# Patient Record
Sex: MALE | Race: WHITE | NOT HISPANIC OR LATINO | Employment: FULL TIME | URBAN - METROPOLITAN AREA
[De-identification: names, ages, dates, MRNs, and addresses within clinical notes are randomized per-mention and may not be internally consistent; named-entity substitution may affect disease eponyms.]

---

## 2021-04-16 ENCOUNTER — BULK ORDERING (OUTPATIENT)
Dept: CASE MANAGEMENT | Facility: OTHER | Age: 21
End: 2021-04-16

## 2021-04-16 DIAGNOSIS — Z23 IMMUNIZATION DUE: ICD-10-CM

## 2021-07-13 ENCOUNTER — OFFICE VISIT (OUTPATIENT)
Dept: FAMILY MEDICINE CLINIC | Facility: CLINIC | Age: 21
End: 2021-07-13

## 2021-07-13 VITALS
OXYGEN SATURATION: 98 % | BODY MASS INDEX: 20.04 KG/M2 | HEART RATE: 100 BPM | RESPIRATION RATE: 16 BRPM | DIASTOLIC BLOOD PRESSURE: 78 MMHG | TEMPERATURE: 98.8 F | SYSTOLIC BLOOD PRESSURE: 112 MMHG | HEIGHT: 70 IN | WEIGHT: 140 LBS

## 2021-07-13 DIAGNOSIS — R10.13 EPIGASTRIC DISCOMFORT: Primary | ICD-10-CM

## 2021-07-13 DIAGNOSIS — Z00.00 LABORATORY EXAMINATION ORDERED AS PART OF A ROUTINE GENERAL MEDICAL EXAMINATION: ICD-10-CM

## 2021-07-13 DIAGNOSIS — E55.9 VITAMIN D DEFICIENCY: ICD-10-CM

## 2021-07-13 PROCEDURE — 99203 OFFICE O/P NEW LOW 30 MIN: CPT | Performed by: PHYSICIAN ASSISTANT

## 2021-07-13 NOTE — PROGRESS NOTES
Subjective   Artemio Muñoz is a 20 y.o. male.     History of Present Illness   Artemio Muñoz 20 y.o. male who presents today for a new patient appointment.    he has a history of There is no problem list on file for this patient.  .  he is here to establish care I reviewed the PFSH recorded today by my MA/LPN staff.   he   He has been feeling well.  Gets episodes occas GERD---and can get episodes epigastric pain---random. Once every 2 mo.  Has not taken anything. Wants to try PRN OTC PPI and see me back if no help or worse    He is quadruplet   Works full time in [employer].  Lives with family  Walks and does exercise  Goes to gym  The following portions of the patient's history were reviewed and updated as appropriate: allergies, current medications, past family history, past medical history, past social history, past surgical history and problem list.    Review of Systems   Constitutional: Negative for fever.   HENT: Negative for nosebleeds and trouble swallowing.    Eyes: Negative for visual disturbance.   Respiratory: Negative for choking and stridor.    Cardiovascular: Positive for chest pain.   Gastrointestinal: Negative for blood in stool.   Endocrine: Negative for polydipsia.   Genitourinary: Negative for genital sores and hematuria.   Musculoskeletal: Negative for arthralgias and joint swelling.   Skin: Negative for color change and rash.   Allergic/Immunologic: Negative for immunocompromised state.   Neurological: Negative for seizures, facial asymmetry and speech difficulty.   Hematological: Negative for adenopathy.   Psychiatric/Behavioral: Negative for behavioral problems, self-injury and suicidal ideas.       Objective   Physical Exam  Vitals and nursing note reviewed.   Constitutional:       General: He is not in acute distress.     Appearance: Normal appearance. He is well-developed. He is not diaphoretic.   HENT:      Head: Normocephalic.      Right Ear: Tympanic membrane, ear canal and  external ear normal.      Left Ear: Tympanic membrane and external ear normal.      Nose: Nose normal.   Eyes:      General: No scleral icterus.     Conjunctiva/sclera: Conjunctivae normal.      Pupils: Pupils are equal, round, and reactive to light.   Neck:      Vascular: No carotid bruit.   Cardiovascular:      Rate and Rhythm: Normal rate and regular rhythm.      Pulses: Normal pulses.      Heart sounds: Normal heart sounds. No murmur heard.     Pulmonary:      Effort: Pulmonary effort is normal.      Breath sounds: Normal breath sounds. No rales.   Abdominal:      General: Abdomen is flat. Bowel sounds are normal.      Palpations: Abdomen is soft. There is no mass.      Tenderness: There is no abdominal tenderness.      Hernia: No hernia is present.   Musculoskeletal:         General: Normal range of motion.      Cervical back: Normal range of motion and neck supple.      Right lower leg: No edema.      Left lower leg: Edema present.   Lymphadenopathy:      Cervical: No cervical adenopathy.   Skin:     General: Skin is warm and dry.      Coloration: Skin is not jaundiced.      Findings: No lesion or rash.   Neurological:      General: No focal deficit present.      Mental Status: He is alert and oriented to person, place, and time. Mental status is at baseline.      Cranial Nerves: No cranial nerve deficit.      Sensory: No sensory deficit.      Motor: No weakness.      Coordination: Coordination normal.   Psychiatric:         Mood and Affect: Mood normal. Affect is not inappropriate.         Behavior: Behavior normal.         Thought Content: Thought content normal.         Judgment: Judgment normal.         Assessment/Plan   Diagnoses and all orders for this visit:    1. Epigastric discomfort (Primary)  Comments:  now resolved and intermittent;  also occas GERD    2. Vitamin D deficiency  -     Comprehensive metabolic panel  -     Lipid panel  -     CBC and Differential  -     TSH  -     T3, Free  -     T4,  Free  -     Vitamin B12  -     Folate  -     Vitamin D 25 Hydroxy  -     Urinalysis With Microscopic - Urine, Clean Catch    3. Laboratory examination ordered as part of a routine general medical examination  -     Comprehensive metabolic panel  -     Lipid panel  -     CBC and Differential  -     TSH  -     T3, Free  -     T4, Free  -     Vitamin B12  -     Folate  -     Vitamin D 25 Hydroxy  -     Urinalysis With Microscopic - Urine, Clean Catch        Ok trial Omeprazole PRN if GERD and or epigastric pain --see me if worse or no help  Scan me old immunization records

## 2021-07-14 LAB
25(OH)D3+25(OH)D2 SERPL-MCNC: 37.7 NG/ML (ref 30–100)
ALBUMIN SERPL-MCNC: 5.2 G/DL (ref 3.5–5.2)
ALBUMIN/GLOB SERPL: 2.2 G/DL
ALP SERPL-CCNC: 81 U/L (ref 39–117)
ALT SERPL-CCNC: 20 U/L (ref 1–41)
APPEARANCE UR: ABNORMAL
AST SERPL-CCNC: 17 U/L (ref 1–40)
BACTERIA #/AREA URNS HPF: NORMAL /HPF
BASOPHILS # BLD AUTO: 0.03 10*3/MM3 (ref 0–0.2)
BASOPHILS NFR BLD AUTO: 0.5 % (ref 0–1.5)
BILIRUB SERPL-MCNC: 0.6 MG/DL (ref 0–1.2)
BILIRUB UR QL STRIP: NEGATIVE
BUN SERPL-MCNC: 11 MG/DL (ref 6–20)
BUN/CREAT SERPL: 13.8 (ref 7–25)
CALCIUM SERPL-MCNC: 10.3 MG/DL (ref 8.6–10.5)
CASTS URNS MICRO: NORMAL
CHLORIDE SERPL-SCNC: 100 MMOL/L (ref 98–107)
CHOLEST SERPL-MCNC: 188 MG/DL (ref 0–200)
CO2 SERPL-SCNC: 30 MMOL/L (ref 22–29)
COLOR UR: YELLOW
CREAT SERPL-MCNC: 0.8 MG/DL (ref 0.76–1.27)
EOSINOPHIL # BLD AUTO: 0.13 10*3/MM3 (ref 0–0.4)
EOSINOPHIL NFR BLD AUTO: 2 % (ref 0.3–6.2)
EPI CELLS #/AREA URNS HPF: NORMAL /HPF
ERYTHROCYTE [DISTWIDTH] IN BLOOD BY AUTOMATED COUNT: 12.1 % (ref 12.3–15.4)
FOLATE SERPL-MCNC: 13.7 NG/ML (ref 4.78–24.2)
GLOBULIN SER CALC-MCNC: 2.4 GM/DL
GLUCOSE SERPL-MCNC: 80 MG/DL (ref 65–99)
GLUCOSE UR QL: NEGATIVE
HCT VFR BLD AUTO: 48.2 % (ref 37.5–51)
HDLC SERPL-MCNC: 58 MG/DL (ref 40–60)
HGB BLD-MCNC: 16.4 G/DL (ref 13–17.7)
HGB UR QL STRIP: NEGATIVE
IMM GRANULOCYTES # BLD AUTO: 0.03 10*3/MM3 (ref 0–0.05)
IMM GRANULOCYTES NFR BLD AUTO: 0.5 % (ref 0–0.5)
KETONES UR QL STRIP: NEGATIVE
LDLC SERPL CALC-MCNC: 106 MG/DL (ref 0–100)
LEUKOCYTE ESTERASE UR QL STRIP: NEGATIVE
LYMPHOCYTES # BLD AUTO: 2.3 10*3/MM3 (ref 0.7–3.1)
LYMPHOCYTES NFR BLD AUTO: 34.8 % (ref 19.6–45.3)
MCH RBC QN AUTO: 30.7 PG (ref 26.6–33)
MCHC RBC AUTO-ENTMCNC: 34 G/DL (ref 31.5–35.7)
MCV RBC AUTO: 90.3 FL (ref 79–97)
MONOCYTES # BLD AUTO: 0.76 10*3/MM3 (ref 0.1–0.9)
MONOCYTES NFR BLD AUTO: 11.5 % (ref 5–12)
NEUTROPHILS # BLD AUTO: 3.35 10*3/MM3 (ref 1.7–7)
NEUTROPHILS NFR BLD AUTO: 50.7 % (ref 42.7–76)
NITRITE UR QL STRIP: NEGATIVE
NRBC BLD AUTO-RTO: 0 /100 WBC (ref 0–0.2)
PH UR STRIP: 7.5 [PH] (ref 5–8)
PLATELET # BLD AUTO: 256 10*3/MM3 (ref 140–450)
POTASSIUM SERPL-SCNC: 4.6 MMOL/L (ref 3.5–5.2)
PROT SERPL-MCNC: 7.6 G/DL (ref 6–8.5)
PROT UR QL STRIP: NEGATIVE
RBC # BLD AUTO: 5.34 10*6/MM3 (ref 4.14–5.8)
RBC #/AREA URNS HPF: NORMAL /HPF
SODIUM SERPL-SCNC: 140 MMOL/L (ref 136–145)
SP GR UR: 1.02 (ref 1–1.03)
T3FREE SERPL-MCNC: 4.3 PG/ML (ref 2–4.4)
T4 FREE SERPL-MCNC: 1.15 NG/DL (ref 0.93–1.7)
TRIGL SERPL-MCNC: 139 MG/DL (ref 0–150)
TSH SERPL DL<=0.005 MIU/L-ACNC: 1.28 UIU/ML (ref 0.27–4.2)
UROBILINOGEN UR STRIP-MCNC: ABNORMAL MG/DL
VIT B12 SERPL-MCNC: 449 PG/ML (ref 211–946)
VLDLC SERPL CALC-MCNC: 24 MG/DL (ref 5–40)
WBC # BLD AUTO: 6.6 10*3/MM3 (ref 3.4–10.8)
WBC #/AREA URNS HPF: NORMAL /HPF

## 2021-09-22 ENCOUNTER — OFFICE VISIT (OUTPATIENT)
Dept: FAMILY MEDICINE CLINIC | Facility: CLINIC | Age: 21
End: 2021-09-22

## 2021-09-22 VITALS
BODY MASS INDEX: 19.18 KG/M2 | HEART RATE: 70 BPM | RESPIRATION RATE: 16 BRPM | DIASTOLIC BLOOD PRESSURE: 69 MMHG | OXYGEN SATURATION: 97 % | WEIGHT: 134 LBS | HEIGHT: 70 IN | TEMPERATURE: 97.8 F | SYSTOLIC BLOOD PRESSURE: 122 MMHG

## 2021-09-22 DIAGNOSIS — R63.4 WEIGHT LOSS, UNINTENTIONAL: ICD-10-CM

## 2021-09-22 DIAGNOSIS — R42 ORTHOSTATIC DIZZINESS: Primary | ICD-10-CM

## 2021-09-22 PROCEDURE — 99213 OFFICE O/P EST LOW 20 MIN: CPT

## 2021-09-22 NOTE — PROGRESS NOTES
"Chief Complaint  Headache, Dizziness, and Blurred Vision    Subjective          Artemio Muñoz presents to [facility]  History of Present Illness    Artemio Muñoz 21 y.o. male who presents with intermittent mild headaches, dizziness, and blurred vision for two days. He had oral surgery on Friday with no complications. He was prescribed Hydrocodone from the oral surgeon. He started taking the medication on Friday, last dose was Saturday afternoon, and was not eating when he started the medication. He returned to a regular diet on Saturday with no difficulty. He reports the dizziness occurs when he changes positions too quickly, and then subsides soon after. No history of hypo/hypertension or hypo/hyperglycemia. No neurological history.  No vision loss, eye pain, photophobia, confusion, seizures, weakness or syncope.  He does not take any daily medications.     The patient lost six pounds in less than two months, although he has been trying to gain weight. He is concerned about his weight loss.        He has a problem list of the following: There is no problem list on file for this patient.  .        Since the last visit, He has overall felt well.        He has been compliant with the following medications: No current outpatient medications on file..        He  denies medication side effects.      All of the other chronic condition(s) listed above are stable w/o issues.    /69   Pulse 70   Temp 97.8 °F (36.6 °C)   Resp 16   Ht 177.8 cm (70\")   Wt 60.8 kg (134 lb)   SpO2 97%   BMI 19.23 kg/m²     Results for orders placed or performed in visit on 07/13/21   Comprehensive metabolic panel    Specimen: Blood   Result Value Ref Range    Glucose 80 65 - 99 mg/dL    BUN 11 6 - 20 mg/dL    Creatinine 0.80 0.76 - 1.27 mg/dL    eGFR Non African Am 122 >60 mL/min/1.73    eGFR African Am 148 >60 mL/min/1.73    BUN/Creatinine Ratio 13.8 7.0 - 25.0    Sodium 140 136 - 145 mmol/L    " Potassium 4.6 3.5 - 5.2 mmol/L    Chloride 100 98 - 107 mmol/L    Total CO2 30.0 (H) 22.0 - 29.0 mmol/L    Calcium 10.3 8.6 - 10.5 mg/dL    Total Protein 7.6 6.0 - 8.5 g/dL    Albumin 5.20 3.50 - 5.20 g/dL    Globulin 2.4 gm/dL    A/G Ratio 2.2 g/dL    Total Bilirubin 0.6 0.0 - 1.2 mg/dL    Alkaline Phosphatase 81 39 - 117 U/L    AST (SGOT) 17 1 - 40 U/L    ALT (SGPT) 20 1 - 41 U/L   Lipid panel    Specimen: Blood   Result Value Ref Range    Total Cholesterol 188 0 - 200 mg/dL    Triglycerides 139 0 - 150 mg/dL    HDL Cholesterol 58 40 - 60 mg/dL    VLDL Cholesterol [place] 24 5 - 40 mg/dL    LDL Chol Calc (NIH) 106 (H) 0 - 100 mg/dL   TSH    Specimen: Blood   Result Value Ref Range    TSH 1.280 0.270 - 4.200 uIU/mL   T3, Free    Specimen: Blood   Result Value Ref Range    T3, Free 4.3 2.0 - 4.4 pg/mL   T4, Free    Specimen: Blood   Result Value Ref Range    Free T4 1.15 0.93 - 1.70 ng/dL   Vitamin B12    Specimen: Blood   Result Value Ref Range    Vitamin B-12 449 211 - 946 pg/mL   Folate    Specimen: Blood   Result Value Ref Range    Folate 13.70 4.78 - 24.20 ng/mL   Vitamin D 25 Hydroxy    Specimen: Blood   Result Value Ref Range    25 Hydroxy, Vitamin D 37.7 30.0 - 100.0 ng/mL   Microscopic Examination -   Result Value Ref Range    WBC, UA 0-2 /HPF    RBC, UA Comment /HPF    Epithelial Cells (non renal) 0-2 /HPF    Cast Type Comment     Bacteria, UA Comment None Seen /HPF   CBC and Differential    Specimen: Blood   Result Value Ref Range    WBC 6.60 3.40 - 10.80 10*3/mm3    RBC 5.34 4.14 - 5.80 10*6/mm3    Hemoglobin 16.4 13.0 - 17.7 g/dL    Hematocrit 48.2 37.5 - 51.0 %    MCV 90.3 79.0 - 97.0 fL    MCH 30.7 26.6 - 33.0 pg    MCHC 34.0 31.5 - 35.7 g/dL    RDW 12.1 (L) 12.3 - 15.4 %    Platelets 256 140 - 450 10*3/mm3    Neutrophil Rel % 50.7 42.7 - 76.0 %    Lymphocyte Rel % 34.8 19.6 - 45.3 %    Monocyte Rel % 11.5 5.0 - 12.0 %    Eosinophil Rel % 2.0 0.3 - 6.2 %    Basophil Rel % 0.5 0.0 - 1.5 %    Neutrophils  "Absolute 3.35 1.70 - 7.00 10*3/mm3    Lymphocytes Absolute 2.30 0.70 - 3.10 10*3/mm3    Monocytes Absolute 0.76 0.10 - 0.90 10*3/mm3    Eosinophils Absolute 0.13 0.00 - 0.40 10*3/mm3    Basophils Absolute 0.03 0.00 - 0.20 10*3/mm3    Immature Granulocyte Rel % 0.5 0.0 - 0.5 %    Immature Grans Absolute 0.03 0.00 - 0.05 10*3/mm3    nRBC 0.0 0.0 - 0.2 /100 WBC   Urinalysis With Microscopic - Urine, Clean Catch    Specimen: Urine, Clean Catch   Result Value Ref Range    Specific Gravity, UA 1.018 1.005 - 1.030    pH, UA 7.5 5.0 - 8.0    Color, UA Yellow     Appearance, UA Turbid (A) Clear    Leukocytes, UA Negative Negative    Protein Negative Negative    Glucose, UA Negative Negative    Ketones Negative Negative    Blood, UA Negative Negative    Bilirubin, UA Negative Negative    Urobilinogen, UA Comment     Nitrite, UA Negative Negative                  Objective     Vital Signs:   /69   Pulse 70   Temp 97.8 °F (36.6 °C)   Resp 16   Ht 177.8 cm (70\")   Wt 60.8 kg (134 lb)   SpO2 97%   BMI 19.23 kg/m²      Review of Systems   Constitutional: Negative for activity change, appetite change, chills, fatigue, fever and unexpected weight loss.   Eyes: Positive for blurred vision. Negative for double vision, photophobia, pain and visual disturbance.   Respiratory: Negative for cough and shortness of breath.    Cardiovascular: Negative for chest pain and leg swelling.   Endocrine: Negative for cold intolerance, heat intolerance, polydipsia, polyphagia and polyuria.   Genitourinary: Negative for frequency and urgency.   Musculoskeletal: Negative for back pain, neck pain and neck stiffness.   Skin: Negative for rash.   Neurological: Positive for dizziness, light-headedness and headache. Negative for seizures, syncope, facial asymmetry, speech difficulty, weakness, numbness, memory problem and confusion.   Psychiatric/Behavioral: Positive for stress. Negative for behavioral problems, dysphoric mood and sleep " disturbance. The patient is nervous/anxious.          Physical Exam  Vitals and nursing note reviewed.   Constitutional:       General: He is not in acute distress.     Appearance: He is well-developed.   HENT:      Head: Normocephalic and atraumatic.   Eyes:      General: No visual field deficit or scleral icterus.        Right eye: No discharge.         Left eye: No discharge.      Extraocular Movements: Extraocular movements intact.      Right eye: Normal extraocular motion and no nystagmus.      Left eye: Normal extraocular motion and no nystagmus.      Conjunctiva/sclera: Conjunctivae normal.      Right eye: Right conjunctiva is not injected. No exudate or hemorrhage.     Left eye: Left conjunctiva is not injected. No exudate or hemorrhage.     Pupils: Pupils are equal, round, and reactive to light.   Neck:      Thyroid: No thyromegaly.      Vascular: No carotid bruit.      Trachea: No tracheal deviation.   Cardiovascular:      Rate and Rhythm: Normal rate and regular rhythm.      Pulses: Normal pulses.      Heart sounds: Normal heart sounds. No murmur heard.   No gallop.    Pulmonary:      Effort: Pulmonary effort is normal. No respiratory distress.      Breath sounds: Normal breath sounds. No wheezing or rales.   Musculoskeletal:         General: Normal range of motion.      Cervical back: Normal range of motion and neck supple.      Right lower leg: No edema.      Left lower leg: No edema.   Skin:     General: Skin is warm.      Coloration: Skin is not pale.      Findings: No erythema or rash.   Neurological:      General: No focal deficit present.      Mental Status: He is alert and oriented to person, place, and time.      Cranial Nerves: No cranial nerve deficit.      Sensory: Sensation is intact. No sensory deficit.      Motor: Motor function is intact. No weakness or abnormal muscle tone.      Coordination: Coordination is intact. Coordination normal.      Gait: Gait is intact. Gait normal.      Deep  Tendon Reflexes: Reflexes normal.   Psychiatric:         Behavior: Behavior normal.         Thought Content: Thought content normal.         Judgment: Judgment normal.          Result Review :                Assessment and Plan      Diagnoses and all orders for this visit:    1. Orthostatic dizziness (Primary)    2. Weight loss, unintentional  -     Ambulatory Referral to Nutrition Services            Follow Up     Return if symptoms worsen or fail to improve.    Patient was given instructions and counseling regarding his condition or for health maintenance advice. Please see specific information pulled into the AVS if appropriate.  Information was given to the patient today in his after visit summary concerning nutrition for young adults.    Preventative counseling provided during visit regarding nutrition and a healthy diet.  An order was placed today for referral to nutrition services.    The patient's random blood glucose in the office today was 107 mg/dL. The patient's blood pressure and heart rate was normal.  I encouraged the patient to monitor blood pressures and report any consistent high or low blood pressure readings. The patient's last eye examination was in June 2021.  I encouraged the patient to follow-up with his ophthalmologist if blurred vision continues.  The patient was instructed to not change positions quickly, eat a healthy diet, drink plenty of fluids, and to report any worsening dizziness or headaches.  The patient verbalized understanding of all instructions given today.

## 2021-09-22 NOTE — PATIENT INSTRUCTIONS
"Well Child Nutrition, Young Adult  This sheet provides general nutrition recommendations. Talk with a health care provider or a diet and nutrition specialist (dietitian) if you have any questions.  Nutrition    The amount of food you need to eat every day depends on your age, sex, size, and activity level. To figure out your daily calorie needs, look for a calorie calculator online or talk with your health care provider.  Balanced diet  Eat a balanced diet. Try to include:  · Fruits. Aim for 2 cups a day. Examples of 1 cup of fruit include 1 large banana, 1 small apple, 8 large strawberries, or 1 large orange. Eat a variety of whole fruits and 100% fruit juice. Choose fresh, canned, frozen, or dried forms. Choose canned fruit that has the lowest added sugar or no added sugar.  · Vegetables. Aim for 2½-3 cups a day. Examples of 1 cup of vegetables include 2 medium carrots, 1 large tomato, or 2 stalks of celery. Choose fresh, frozen, canned, and dried options. Eat vegetables of a variety of colors.  · Low-fat dairy. Aim for 3 cups a day. Examples of 1 cup of dairy include 8 oz (230 mL) of milk, 8 oz (230 g) of yogurt, or 1½ oz (44 g) of natural cheese. Choose fat-free or low-fat dairy products, including milk, yogurt, and cheese. If you are unable to tolerate dairy (lactose intolerant) or you choose not to consume dairy, you may include fortified soy beverages (soy milk).  · Whole grains. Of the grain foods that you eat each day (such as pasta, rice, and tortillas), aim to include 6-8 \"ounce-equivalents\" of whole-grain options. Examples of 1 ounce-equivalent of whole grains include 1 cup of whole-wheat cereal, ½ cup of brown rice, or 1 slice of whole-wheat bread. Try to choose whole grains including brown rice, wild rice, quinoa, and oats.  · Lean proteins. Aim for 5½-6½ \"ounce-equivalents\" a day. Eat a variety of protein foods, including lean meats, seafood, poultry, eggs, legumes (beans and peas), nuts, seeds, and " soy products.  ? A cut of meat or fish that is the size of a deck of cards is about 3-4 ounce-equivalents.  ? Foods that provide 1 ounce-equivalent of protein include 1 egg, ½ cup of nuts or seeds, or 1 tablespoon (16 g) of peanut butter.  For more information and options for foods in a balanced diet, visit www.choosemyplate.gov  Tips for healthy snacking  · A snack should not be the size of a full meal. Eat snacks that have 200 calories or less. Examples include:  ? ½ whole-wheat jacques with ¼ cup hummus.  ? 2 or 3 slices of deli turkey wrapped around a cheese stick.  ? ½ apple with 1 tablespoon of peanut butter.  ? 10 baked chips with salsa.  · Keep cut-up fruits and vegetables available at home and at school so they are easy to eat.  · Pack healthy snacks the night before or when you pack your lunch.  · Avoid pre-packaged foods. These tend to be higher in fat, sugar, and salt (sodium).  · Get involved with shopping, or ask the primary food  in your household to get healthy snacks that you like.  · Avoid chips, candy, cake, and soft drinks.  Foods to avoid  · Fried or heavily processed foods, such as toaster pastries and microwaveable dinners.  · Drinks that contain a lot of sugar, such as sports drinks, sodas, and juice.  · Foods that contain a lot of fat, sodium, or sugar.  Food safety  Prepare your food safely:  · Wash your hands after handling raw meats.  · Keep food preparation surfaces clean by washing them regularly with hot, soapy water.  · Keep raw meats separate from foods that are ready-to-eat, such as fruits and vegetables.  · , meat, poultry, and eggs to the recommended minimum safe internal temperature.  · Store foods at safe temperatures. In general:  ? Keep cold foods at 40°F (4°C) or colder.  ? Keep your freezer at 0°F (-18°C or 18 degrees below 0°C) or colder.  ? Keep hot foods at 140°F (60°C) or warmer.  ? Foods are no longer safe to eat when they have been at a temperature of  °F (4-60°C) for more than 2 hours.  Physical activity  · Try to get 150 minutes of moderate-intensity physical activity each week. Examples include walking briskly or bicycling slower than 10 miles an hour (16 km an hour).  · Do muscle-strengthening exercises on 2 or more days a week.  · If you find it difficult to fit regular physical activity into your schedule, try:  ? Taking the stairs instead of the elevator.  ? Parking your car farther from the entrance or at the back of the parking lot.  ? Biking or walking to work or school.  · If you need to lose weight, you may need to reduce your daily calorie intake and increase your daily amount of physical activity. Check with your health care provider before you start a new diet and exercise plan.  General instructions  · Do not skip meals, especially breakfast.  · Water is the ideal beverage. Aim to drink six 8-oz glasses of water each day.  · Avoid fad diets. These may affect your mood and growth.  · If you choose to consume alcohol:  ? Drink in moderation. This means two drinks a day for men and one drink a day for nonpregnant women. One drink equals 12 oz of beer, 5 oz of wine, or 1½ oz of hard liquor.  · You may drink coffee. It is recommended that you limit coffee intake to three to five 8-oz cups a day (up to 400 mg of caffeine).  · If you are worried about your body image, talk with your parents, your health care provider, or another trusted adult like a  or counselor. You may be at risk for developing an eating disorder. Eating disorders can lead to serious medical problems.  · Food allergies may cause you to have a reaction (such as a rash, diarrhea, or vomiting) after eating or drinking. Talk with your health care provider if you have concerns about food allergies.  Summary  · Eat a balanced diet. Include fruits, vegetables, low-fat dairy, whole grains, and lean proteins.  · Try to get 150 minutes of moderate-intensity physical activity each  week, and do muscle-strengthening exercises on 2 or more days a week.  · Choose healthy snacks that are 200 calories or less.  · Drink plenty of water. Try to drink six 8-oz glasses a day.  This information is not intended to replace advice given to you by your health care provider. Make sure you discuss any questions you have with your health care provider.  Document Revised: 04/07/2020 Document Reviewed: 08/01/2018  [employer]vier Patient Education © 2021 [employer]

## 2022-02-14 ENCOUNTER — HOSPITAL ENCOUNTER (EMERGENCY)
Facility: HOSPITAL | Age: 22
Discharge: HOME | End: 2022-02-14
Attending: EMERGENCY MEDICINE | Admitting: EMERGENCY MEDICINE

## 2022-02-14 ENCOUNTER — APPOINTMENT (OUTPATIENT)
Dept: GENERAL RADIOLOGY | Facility: HOSPITAL | Age: 22
End: 2022-02-14

## 2022-02-14 VITALS
WEIGHT: 145 LBS | HEIGHT: 69 IN | OXYGEN SATURATION: 95 % | HEART RATE: 91 BPM | BODY MASS INDEX: 21.48 KG/M2 | DIASTOLIC BLOOD PRESSURE: 64 MMHG | RESPIRATION RATE: 17 BRPM | TEMPERATURE: 98.3 F | SYSTOLIC BLOOD PRESSURE: 119 MMHG

## 2022-02-14 DIAGNOSIS — R06.02 SHORTNESS OF BREATH: ICD-10-CM

## 2022-02-14 DIAGNOSIS — R07.89 ATYPICAL CHEST PAIN: Primary | ICD-10-CM

## 2022-02-14 DIAGNOSIS — R00.2 PALPITATIONS: ICD-10-CM

## 2022-02-14 LAB
ANION GAP SERPL CALCULATED.3IONS-SCNC: 16 MMOL/L (ref 5–15)
BASOPHILS # BLD AUTO: 0.03 10*3/MM3 (ref 0–0.2)
BASOPHILS NFR BLD AUTO: 0.4 % (ref 0–1.5)
BUN SERPL-MCNC: 10 MG/DL (ref 6–20)
BUN/CREAT SERPL: 11 (ref 7–25)
CALCIUM SPEC-SCNC: 10 MG/DL (ref 8.6–10.5)
CHLORIDE SERPL-SCNC: 102 MMOL/L (ref 98–107)
CK SERPL-CCNC: 235 U/L (ref 20–200)
CO2 SERPL-SCNC: 22 MMOL/L (ref 22–29)
CREAT SERPL-MCNC: 0.91 MG/DL (ref 0.76–1.27)
D DIMER PPP FEU-MCNC: <0.27 MCGFEU/ML (ref 0–0.49)
DEPRECATED RDW RBC AUTO: 39.6 FL (ref 37–54)
EOSINOPHIL # BLD AUTO: 0.04 10*3/MM3 (ref 0–0.4)
EOSINOPHIL NFR BLD AUTO: 0.5 % (ref 0.3–6.2)
ERYTHROCYTE [DISTWIDTH] IN BLOOD BY AUTOMATED COUNT: 12.2 % (ref 12.3–15.4)
ERYTHROCYTE [SEDIMENTATION RATE] IN BLOOD: 4 MM/HR (ref 0–15)
GFR SERPL CREATININE-BSD FRML MDRD: 104 ML/MIN/1.73
GLUCOSE SERPL-MCNC: 105 MG/DL (ref 65–99)
HCT VFR BLD AUTO: 45.5 % (ref 37.5–51)
HGB BLD-MCNC: 15.9 G/DL (ref 13–17.7)
HOLD SPECIMEN: NORMAL
HOLD SPECIMEN: NORMAL
IMM GRANULOCYTES # BLD AUTO: 0.02 10*3/MM3 (ref 0–0.05)
IMM GRANULOCYTES NFR BLD AUTO: 0.3 % (ref 0–0.5)
LYMPHOCYTES # BLD AUTO: 1.99 10*3/MM3 (ref 0.7–3.1)
LYMPHOCYTES NFR BLD AUTO: 26.7 % (ref 19.6–45.3)
MCH RBC QN AUTO: 31.2 PG (ref 26.6–33)
MCHC RBC AUTO-ENTMCNC: 34.9 G/DL (ref 31.5–35.7)
MCV RBC AUTO: 89.2 FL (ref 79–97)
MONOCYTES # BLD AUTO: 0.7 10*3/MM3 (ref 0.1–0.9)
MONOCYTES NFR BLD AUTO: 9.4 % (ref 5–12)
NEUTROPHILS NFR BLD AUTO: 4.67 10*3/MM3 (ref 1.7–7)
NEUTROPHILS NFR BLD AUTO: 62.7 % (ref 42.7–76)
NRBC BLD AUTO-RTO: 0 /100 WBC (ref 0–0.2)
PLATELET # BLD AUTO: 289 10*3/MM3 (ref 140–450)
PMV BLD AUTO: 8.9 FL (ref 6–12)
POTASSIUM SERPL-SCNC: 3.1 MMOL/L (ref 3.5–5.2)
QT INTERVAL: 334 MS
RBC # BLD AUTO: 5.1 10*6/MM3 (ref 4.14–5.8)
SODIUM SERPL-SCNC: 140 MMOL/L (ref 136–145)
TROPONIN T SERPL-MCNC: <0.01 NG/ML (ref 0–0.03)
WBC NRBC COR # BLD: 7.45 10*3/MM3 (ref 3.4–10.8)
WHOLE BLOOD HOLD SPECIMEN: NORMAL
WHOLE BLOOD HOLD SPECIMEN: NORMAL

## 2022-02-14 PROCEDURE — 80048 BASIC METABOLIC PNL TOTAL CA: CPT | Performed by: EMERGENCY MEDICINE

## 2022-02-14 PROCEDURE — 71045 X-RAY EXAM CHEST 1 VIEW: CPT

## 2022-02-14 PROCEDURE — 96372 THER/PROPH/DIAG INJ SC/IM: CPT

## 2022-02-14 PROCEDURE — 85379 FIBRIN DEGRADATION QUANT: CPT | Performed by: EMERGENCY MEDICINE

## 2022-02-14 PROCEDURE — 84484 ASSAY OF TROPONIN QUANT: CPT | Performed by: EMERGENCY MEDICINE

## 2022-02-14 PROCEDURE — 93005 ELECTROCARDIOGRAM TRACING: CPT

## 2022-02-14 PROCEDURE — 85652 RBC SED RATE AUTOMATED: CPT | Performed by: EMERGENCY MEDICINE

## 2022-02-14 PROCEDURE — 25010000002 KETOROLAC TROMETHAMINE PER 15 MG: Performed by: EMERGENCY MEDICINE

## 2022-02-14 PROCEDURE — 93005 ELECTROCARDIOGRAM TRACING: CPT | Performed by: EMERGENCY MEDICINE

## 2022-02-14 PROCEDURE — 85025 COMPLETE CBC W/AUTO DIFF WBC: CPT | Performed by: EMERGENCY MEDICINE

## 2022-02-14 PROCEDURE — 82550 ASSAY OF CK (CPK): CPT | Performed by: EMERGENCY MEDICINE

## 2022-02-14 PROCEDURE — 99284 EMERGENCY DEPT VISIT MOD MDM: CPT

## 2022-02-14 PROCEDURE — 93010 ELECTROCARDIOGRAM REPORT: CPT | Performed by: INTERNAL MEDICINE

## 2022-02-14 RX ORDER — KETOROLAC TROMETHAMINE 15 MG/ML
15 INJECTION, SOLUTION INTRAMUSCULAR; INTRAVENOUS ONCE
Status: COMPLETED | OUTPATIENT
Start: 2022-02-14 | End: 2022-02-14

## 2022-02-14 RX ORDER — NAPROXEN 500 MG/1
500 TABLET ORAL 2 TIMES DAILY PRN
Qty: 20 TABLET | Refills: 0 | Status: SHIPPED | OUTPATIENT
Start: 2022-02-14 | End: 2022-03-18 | Stop reason: SDDI

## 2022-02-14 RX ADMIN — KETOROLAC TROMETHAMINE 15 MG: 15 INJECTION, SOLUTION INTRAMUSCULAR; INTRAVENOUS at 16:38

## 2022-02-14 NOTE — DISCHARGE INSTRUCTIONS
Return to the emergency department for worsening or recurrent symptoms, fever, dizziness, fainting, or other concern.  Follow-up with [facility] if symptoms persist.  Follow-up with your primary care provider in the next 1 to 2 weeks.

## 2022-02-14 NOTE — ED PROVIDER NOTES
EMERGENCY DEPARTMENT ENCOUNTER    Room Number:  16/16  Date of encounter:  2/14/2022  PCP: Lore Muñoz PA-C  Historian: Patient, mother    I used full protective equipment while examining this patient.  This includes face mask, gloves and protective eyewear.  I washed my hands before entering the room and immediately upon leaving the room.  Patient was wearing a surgical mask.      HPI:  Chief Complaint: Chest pain  A complete HPI/ROS/PMH/PSH/SH/FH are unobtainable due to: None    Context: Artemio Muñoz is a 21 y.o. male who presents to the ED c/o constant sharp left-sided chest pain since yesterday.  Pain does not radiate.  Nothing makes it better or worse.  Pain is currently 7/10.  Patient reports shortness of breath with exertion and intermittent palpitations.  Heart rate was 185 at triage but is now in the 90s..  Patient drank a cup of coffee this morning which is unusual for him.  Denies taking decongestants or stimulants.  Denies fever, dizziness, syncope, abdominal pain, nausea, vomiting, sweating, leg pain, or leg swelling.  Patient had Covid 2 weeks ago.  He has not been vaccinated.  Denies history of heart disease or prior PE/DVT.      PAST MEDICAL HISTORY  Active Ambulatory Problems     Diagnosis Date Noted   • No Active Ambulatory Problems     Resolved Ambulatory Problems     Diagnosis Date Noted   • No Resolved Ambulatory Problems     Past Medical History:   Diagnosis Date   • Acquired coronary artery fistula          PAST SURGICAL HISTORY  History reviewed. No pertinent surgical history.      FAMILY HISTORY  Family History   Problem Relation Age of Onset   • Hyperlipidemia Mother    • Hypertension Mother    • Anxiety disorder Sister          SOCIAL HISTORY  Social History     Socioeconomic History   • Marital status: Single   Tobacco Use   • Smoking status: Never Smoker   • Smokeless tobacco: Never Used   Vaping Use   • Vaping Use: Never used   Substance and Sexual Activity   • Alcohol use: Yes      Alcohol/week: 1.0 standard drink     Types: 1 Cans of beer per week   • Drug use: Never   • Sexual activity: Defer         ALLERGIES  Patient has no known allergies.       REVIEW OF SYSTEMS  Review of Systems   All systems have been reviewed and are negative except as as discussed in the HPI    PHYSICAL EXAM    I have reviewed the triage vital signs and nursing notes.    ED Triage Vitals [02/14/22 1408]   Temp Heart Rate Resp BP SpO2   98.3 °F (36.8 °C) 118 18 154/92 100 %      Temp src Heart Rate Source Patient Position BP Location FiO2 (%)   Tympanic Monitor Standing -- --       Physical Exam  GENERAL: Awake, alert, oriented x3.  Well-developed, well-nourished male.  Resting comfortably no acute distress.  HENT: NCAT, nares patent, moist mucous membranes  NECK: supple  EYES: Extraocular muscles intact, no scleral icterus  CV: regular rhythm, regular rate  RESPIRATORY: normal effort, clear to auscultation bilaterally  ABDOMEN: soft, nontender  MUSCULOSKELETAL: Extremities are nontender and without obvious deformity.  There is normal range of motion in all extremities.  There is no calf tenderness or pedal edema.  Chest wall is nontender.  NEURO: Strength, sensation, and coordination are grossly intact.  Speech and mentation are unremarkable.  No facial droop.  SKIN: warm, dry, no rash  PSYCH: Normal mood and affect      LAB RESULTS  Recent Results (from the past 24 hour(s))   ECG 12 Lead    Collection Time: 02/14/22  2:11 PM   Result Value Ref Range    QT Interval 334 ms   Green Top (Gel)    Collection Time: 02/14/22  3:36 PM   Result Value Ref Range    Extra Tube Hold for add-ons.    Lavender Top    Collection Time: 02/14/22  3:36 PM   Result Value Ref Range    Extra Tube hold for add-on    Gold Top - SST    Collection Time: 02/14/22  3:36 PM   Result Value Ref Range    Extra Tube Hold for add-ons.    Light Blue Top    Collection Time: 02/14/22  3:36 PM   Result Value Ref Range    Extra Tube hold for add-on     Basic Metabolic Panel    Collection Time: 02/14/22  3:36 PM    Specimen: Blood   Result Value Ref Range    Glucose 105 (H) 65 - 99 mg/dL    BUN 10 6 - 20 mg/dL    Creatinine 0.91 0.76 - 1.27 mg/dL    Sodium 140 136 - 145 mmol/L    Potassium 3.1 (L) 3.5 - 5.2 mmol/L    Chloride 102 98 - 107 mmol/L    CO2 22.0 22.0 - 29.0 mmol/L    Calcium 10.0 8.6 - 10.5 mg/dL    eGFR Non African Amer 104 >60 mL/min/1.73    BUN/Creatinine Ratio 11.0 7.0 - 25.0    Anion Gap 16.0 (H) 5.0 - 15.0 mmol/L   D-dimer, Quantitative    Collection Time: 02/14/22  3:36 PM    Specimen: Blood   Result Value Ref Range    D-Dimer, Quantitative <0.27 0.00 - 0.49 MCGFEU/mL   Troponin    Collection Time: 02/14/22  3:36 PM    Specimen: Blood   Result Value Ref Range    Troponin T <0.010 0.000 - 0.030 ng/mL   CK    Collection Time: 02/14/22  3:36 PM    Specimen: Blood   Result Value Ref Range    Creatine Kinase 235 (H) 20 - 200 U/L   Sedimentation Rate    Collection Time: 02/14/22  3:36 PM    Specimen: Blood   Result Value Ref Range    Sed Rate 4 0 - 15 mm/hr   CBC Auto Differential    Collection Time: 02/14/22  3:36 PM    Specimen: Blood   Result Value Ref Range    WBC 7.45 3.40 - 10.80 10*3/mm3    RBC 5.10 4.14 - 5.80 10*6/mm3    Hemoglobin 15.9 13.0 - 17.7 g/dL    Hematocrit 45.5 37.5 - 51.0 %    MCV 89.2 79.0 - 97.0 fL    MCH 31.2 26.6 - 33.0 pg    MCHC 34.9 31.5 - 35.7 g/dL    RDW 12.2 (L) 12.3 - 15.4 %    RDW-SD 39.6 37.0 - 54.0 fl    MPV 8.9 6.0 - 12.0 fL    Platelets 289 140 - 450 10*3/mm3    Neutrophil % 62.7 42.7 - 76.0 %    Lymphocyte % 26.7 19.6 - 45.3 %    Monocyte % 9.4 5.0 - 12.0 %    Eosinophil % 0.5 0.3 - 6.2 %    Basophil % 0.4 0.0 - 1.5 %    Immature Grans % 0.3 0.0 - 0.5 %    Neutrophils, Absolute 4.67 1.70 - 7.00 10*3/mm3    Lymphocytes, Absolute 1.99 0.70 - 3.10 10*3/mm3    Monocytes, Absolute 0.70 0.10 - 0.90 10*3/mm3    Eosinophils, Absolute 0.04 0.00 - 0.40 10*3/mm3    Basophils, Absolute 0.03 0.00 - 0.20 10*3/mm3    Immature  Grans, Absolute 0.02 0.00 - 0.05 10*3/mm3    nRBC 0.0 0.0 - 0.2 /100 WBC       Ordered the above labs and independently reviewed the results.      RADIOLOGY  XR Chest AP    Result Date: 2022  ONE VIEW PORTABLE CHEST  HISTORY: Cough and fever. Possible Covid 19 pneumonia.  FINDINGS: The lungs are well-expanded and clear and the heart and hilar structures are normal. There is no evidence of pneumonia.  This report was finalized on 2022 2:40 PM by Dr. Kofi Umana M.D.        I ordered the above noted radiological studies. Reviewed by me and discussed with radiologist.  See dictation for official radiology interpretation.      PROCEDURES  Procedures      MEDICATIONS GIVEN IN ER    Medications   ketorolac (TORADOL) injection 15 mg (15 mg Intravenous Given 22 1638)         PROGRESS, DATA ANALYSIS, CONSULTS, AND MEDICAL DECISION MAKING    All labs have been independently reviewed by me.  All radiology studies have been reviewed by me and discussed with radiologist dictating the report.   EKG's independently viewed and interpreted by me.  I have reviewed the nurse's notes, vital signs, past medical history, and medication list.  Discussion below represents my analysis of pertinent findings related to patient's condition, differential diagnosis, treatment plan and final disposition.    DDx includes but is not limited to acute coronary syndrome, pulmonary embolism, thoracic aortic dissection, pneumonia, pneumothorax, musculoskeletal pain, GERD or esophageal spasm, anxiety, myocarditis/pericarditis, esophageal rupture, pancreatitis.     History: 0  EC  Age: 0  Risk factors: 0    HEART score: 1        ED Course as of 22 1833      1548 Old records reviewed.  Patient had an echocardiogram done in 2021 which showed an LVEF of 60%.  There was no significant valvular disease. [WH]   1549 Chest x-ray is negative acute. [WH]   1557 EKG          EKG time: 1411  Rhythm/Rate: Sinus  rhythm, rate 87  P waves and W[place]: Normal  QRS, axis: Normal axis, LVH  ST and T waves: Nonspecific ST/T wave changes in the inferior leads    Interpreted Contemporaneously by me, independently viewed  No prior available for comparison    [WH]   1650 Troponin T: <0.010 [WH]   1650 Sed Rate: 4 [WH]   1650 Creatine Kinase(!): 235 [WH]   1757 Test results discussed with the patient.  He is resting and breathing comfortably.  Heart rate is in the 90s.  Oxygen saturation is 99% on room air.  Patient will be discharged and referred to cardiology for follow-up.  Return precautions were discussed.  Patient was advised to limit his caffeine intake. [WH]   1832 Patient presented to ED complaining of constant sharp chest pain since yesterday.  Also reported shortness of breath and palpitations.  He was tachycardic at triage but his heart rate decreased without intervention.  EKG had nonspecific changes.  Labs were unremarkable including normal troponin, sed rate, and D-dimer.  Patient had Covid recently.  Chest x-ray did not show any evidence of pneumonia.  He was nonhypoxic.  Pain could be due to pleurisy from recent Covid.  Cardiac enzymes were normal so I do not suspect that he has myocarditis.  He will be referred to cardiology for follow-up.  He will be given prescription for naproxen. [WH]      ED Course User Index  [WH] Artemio Bass MD       AS OF 18:33 EST VITALS:    BP - 119/64  HR - 91  TEMP - 98.3 °F (36.8 °C) (Tympanic)  O2 SATS - 95%      DIAGNOSIS  Final diagnoses:   Atypical chest pain   Shortness of breath   Palpitations         DISPOSITION  Discharge    DISCHARGE    Patient discharged in stable condition.    Reviewed implications of results, diagnosis, meds, responsibility to follow up, warning signs and symptoms of possible worsening, potential complications and reasons to return to ER, including worsening or recurrent symptoms, fever, fainting, dizziness, or other concern.      Patient/Family voiced  understanding of above instructions.    Discussed plan for discharge, as there is no emergent indication for admission. Patient referred to primary care provider for BP management due to today's BP. Pt/family is agreeable and understands need for follow up and repeat testing.  Pt is aware that discharge does not mean that nothing is wrong but it indicates no emergency is present that requires admission and they must continue care with follow-up as given below or physician of their choice.     FOLLOW-UP  Lore Muñoz, JAY  [address] [facility] [address]  [facility] [id number] [phone]    Schedule an appointment as soon as possible for a visit       [facility] CARDIOLOGY  [address] [facility] [address] [phone]  Schedule an appointment as soon as possible for a visit            Medication List      New Prescriptions    naproxen 500 MG tablet  Commonly known as: NAPROSYN  Take 1 tablet by mouth 2 (Two) Times a Day As Needed for Moderate Pain .           Where to Get Your Medications      These medications were sent to [id number] - [place], [place] - [address] [facility] [address] [facility] [phone] [facility] [phone]   [address] [facility] [address]    Phone: [phone]   · naproxen 500 MG tablet           Dictated utilizing Dragon dictation:  Much of this encounter note is an electronic transcription/translation of spoken language to printed text. The electronic translation of spoken language may permit erroneous, or at times, nonsensical words or phrases to be inadvertently transcribed; Although I have reviewed the note for such errors, some may still exist.     Artemio Bass MD  02/14/22 8276

## 2022-03-18 ENCOUNTER — OFFICE VISIT (OUTPATIENT)
Dept: FAMILY MEDICINE CLINIC | Facility: CLINIC | Age: 22
End: 2022-03-18

## 2022-03-18 VITALS
BODY MASS INDEX: 19.7 KG/M2 | HEART RATE: 92 BPM | SYSTOLIC BLOOD PRESSURE: 110 MMHG | TEMPERATURE: 97.5 F | OXYGEN SATURATION: 97 % | RESPIRATION RATE: 16 BRPM | DIASTOLIC BLOOD PRESSURE: 60 MMHG | WEIGHT: 133 LBS | HEIGHT: 69 IN

## 2022-03-18 DIAGNOSIS — F41.0 PANIC ATTACK: ICD-10-CM

## 2022-03-18 DIAGNOSIS — Z09 HOSPITAL DISCHARGE FOLLOW-UP: ICD-10-CM

## 2022-03-18 DIAGNOSIS — F41.1 GENERALIZED ANXIETY DISORDER: Primary | ICD-10-CM

## 2022-03-18 PROCEDURE — 90715 TDAP VACCINE 7 YRS/> IM: CPT | Performed by: PHYSICIAN ASSISTANT

## 2022-03-18 PROCEDURE — 90471 IMMUNIZATION ADMIN: CPT | Performed by: PHYSICIAN ASSISTANT

## 2022-03-18 PROCEDURE — 99214 OFFICE O/P EST MOD 30 MIN: CPT | Performed by: PHYSICIAN ASSISTANT

## 2022-03-18 RX ORDER — LORAZEPAM 1 MG/1
TABLET ORAL
Qty: 20 TABLET | Refills: 0 | Status: SHIPPED | OUTPATIENT
Start: 2022-03-18

## 2022-03-18 RX ORDER — ESCITALOPRAM OXALATE 10 MG/1
TABLET ORAL
Qty: 30 TABLET | Refills: 1 | Status: SHIPPED | OUTPATIENT
Start: 2022-03-18 | End: 2022-04-22 | Stop reason: SDUPTHER

## 2022-03-18 NOTE — PROGRESS NOTES
Subjective   Artemio Muñoz is a 21 y.o. male.     History of Present Illness   Artemio Muñoz 21 y.o. male presents today for Emergency Room follow up.  he was treated 2/14/2022 for chest pain.  I reviewed all of the labs and diagnostic testing and noted: 1 view chest x-ray negative  CBC 2/17/2022 in normal range, magnesium normal 2.1  The patient's medications were not changed:  Current outpatient and discharge medications have been reconciled for the patient.  CMP with normal creatinine.  2/17/2010 potassium was 3.1 and 2/14/2022 but repeated on 2/17/2022 and was normal at 3.8.  Reviewed by: Lore Muñoz PA-C   Elevated CK level 214 22-35.  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 male who presents to the ED c/o constant sharp left-sided chest pain since yesterday.  Pain does not radiate.  Nothing makes it better or worse.  Pain is currently 7/10.  Patient reports shortness of breath with exertion and intermittent palpitations.  Heart rate was 185 at triage but is now in the 90s..  Patient drank a cup of coffee this morning which is unusual for him.  Denies taking decongestants or stimulants.  Denies fever, dizziness, syncope, abdominal pain, nausea, vomiting, sweating, leg pain, or leg swelling.  Patient had Covid 2 weeks ago.  He has not been vaccinated.  Denies history of heart disease or prior PE/DVT.    CT heart calcium score was 0 and performed on 2/22/2022 and did confirm the evidence of left-to-right shunting, suggestive of patent foramen ovale, normal variant.  Also small LAD to PA fistula, which does not appear to be hemodynamically significant.  Otherwise the coronary arteries are normal  Must note:  Miscellaneous Notes  - documented in this encounter      Result QuickNote - Pedro Ferrara MD - 03/11/2022 4:37 PM  "EST  Formatting of this note might be different from the original.  Stress test is completely normal indicating the small connection between his heart and lung arteries is not causing any difficulty in his heart pumping or receiving blood even during maximum exercise. Given the normal stress test along with the normal echo and Holter, there is no reason to believe his chest pain is from an abnormal heart condition. The finding on the CAT scan is incidental and does not require any further evaluation.     Electronically signed by Pedro Ferrara MD at 03/11/2022 4:37 PM EST    Saw cardiologist Dr. Ferrara on 2/17/2022 also noted prior echocardiogram normal.  Audiologist notes patient was very anxious at appointment and had tachycardia associated with this but sinus tachycardia.  And dizziness.  RESULTS:   EKG reviewed by me and shows sinus tachycardia with voltage criteria for LVH    Signed Prescriptions Disp Refills   • metoprolol (LOPRESSOR) 50 MG tablet 4 tablet 0   Sig: Take one tab (50mg) twice a day the day prior to heart CT and on the day of CT, take two tabs (100mg) 2 hours prior to CT scan appointment.   Notes above from his stress test ordered at this visit and performed 3/11/2022 and negative    Artemio Muñoz male 21 y.o., /60 (BP Location: Left arm, Patient Position: Sitting, Cuff Size: Adult)   Pulse 92   Temp 97.5 °F (36.4 °C)   Resp 16   Ht 175.3 cm (69.02\")   Wt 60.3 kg (133 lb)   SpO2 97%   BMI 19.63 kg/m²   who presents today for new evaluation and treatment of Anxiety and Panic Attacks.  He reports anxiety, impaired concentration, excessive worry, unable to relax and panic feeling. Onset of symptoms was approximately several months ago. He denies current suicidal and homicidal ideation. Risk factors are family history of anxiety and or depression and lifestyle of multiple roles.  Previous treatment includes none. He complains of the following medication side effects:none. The patient " has had no previous counseling..not depressed  Here with girlfriend  Update Tdap  I see mom and sister  The patient has read and signed the [facility] Controlled Substance Contract.  I will continue to see patient for regular follow up appointments.  They are well controlled on their medication.  DAPHNE has been reviewed by me and is updated every 3 months. The patient is aware of the potential for addiction and dependence.    Also gets GI upset with anxiety--diarrhea and nausea    The following portions of the patient's history were reviewed and updated as appropriate: allergies, current medications, past family history, past medical history, past social history, past surgical history and problem list.    Review of Systems   Constitutional: Negative for activity change, appetite change and unexpected weight change.   HENT: Negative for nosebleeds and trouble swallowing.    Eyes: Negative for pain and visual disturbance.   Respiratory: Negative for chest tightness, shortness of breath and wheezing.    Cardiovascular: Negative for chest pain and palpitations.   Gastrointestinal: Negative for abdominal pain and blood in stool.   Endocrine: Negative.    Genitourinary: Negative for difficulty urinating and hematuria.   Musculoskeletal: Negative for joint swelling.   Skin: Negative for color change and rash.   Allergic/Immunologic: Negative.    Neurological: Negative for syncope and speech difficulty.   Hematological: Negative for adenopathy.   Psychiatric/Behavioral: Negative for agitation and confusion. The patient is nervous/anxious.    All other systems reviewed and are negative.      Objective   Physical Exam  Vitals and nursing note reviewed.   Constitutional:       General: He is not in acute distress.     Appearance: He is well-developed. He is not diaphoretic.   HENT:      Head: Normocephalic.   Eyes:      Conjunctiva/sclera: Conjunctivae normal.      Pupils: Pupils are equal, round, and reactive to light.    Cardiovascular:      Rate and Rhythm: Normal rate and regular rhythm.      Heart sounds: Normal heart sounds. No murmur heard.  Pulmonary:      Effort: Pulmonary effort is normal.      Breath sounds: Normal breath sounds.   Musculoskeletal:         General: Normal range of motion.      Cervical back: Normal range of motion and neck supple.      Right lower leg: No edema.      Left lower leg: No edema.   Skin:     General: Skin is warm and dry.      Coloration: Skin is not jaundiced.      Findings: No rash.   Neurological:      General: No focal deficit present.      Mental Status: He is alert and oriented to person, place, and time.   Psychiatric:         Mood and Affect: Mood normal. Affect is not inappropriate.         Behavior: Behavior normal.         Thought Content: Thought content normal.         Judgment: Judgment normal.         Assessment/Plan   Diagnoses and all orders for this visit:    1. Generalized anxiety disorder (Primary)  -     LORazepam (Ativan) 1 MG tablet; 1/2-1 PO Q 8 hours PRN panic attacks  Dispense: 20 tablet; Refill: 0    2. Panic attack  -     LORazepam (Ativan) 1 MG tablet; 1/2-1 PO Q 8 hours PRN panic attacks  Dispense: 20 tablet; Refill: 0    Other orders  -     escitalopram (LEXAPRO) 10 MG tablet; Start with 1/2 tab PO daily for 4 days then one po daily for stress  Dispense: 30 tablet; Refill: 1      Start Rx for anxiety  Lexapro and PRN Ativan  Cardiac eval was neg  Labs ok  F/u 1 mo

## 2022-03-18 NOTE — PROGRESS NOTES
Immunization  Immunization performed in   LD by Sujey Aquino MA. Patient tolerated the procedure well without complications.  03/18/22   Sujey Aquino MA

## 2022-03-18 NOTE — PATIENT INSTRUCTIONS
"[email or web address]">   Generalized Anxiety Disorder, Adult  Generalized anxiety disorder (HERNÁN) is a mental health condition. Unlike normal worries, anxiety related to HERNÁN is not triggered by a specific event. These worries do not fade or get better with time. HERNÁN interferes with relationships, work, and school.  HERNÁN symptoms can vary from mild to severe. People with severe HERNÁN can have intense waves of anxiety with physical symptoms that are similar to panic attacks.  What are the causes?  The exact cause of HERNÁN is not known, but the following are believed to have an impact:  Differences in natural brain chemicals.  Genes passed down from parents to children.  Differences in the way threats are perceived.  Development during childhood.  Personality.  What increases the risk?  The following factors may make you more likely to develop this condition:  Being female.  Having a family history of anxiety disorders.  Being very shy.  Experiencing very stressful life events, such as the death of a loved one.  Having a very stressful family environment.  What are the signs or symptoms?  People with HERNÁN often worry excessively about many things in their lives, such as their health and family. Symptoms may also include:  Mental and emotional symptoms:  Worrying excessively about natural disasters.  Fear of being late.  Difficulty concentrating.  Fears that others are judging your performance.  Physical symptoms:  Fatigue.  Headaches, muscle tension, muscle twitches, trembling, or feeling shaky.  Feeling like your heart is pounding or beating very fast.  Feeling out of breath or like you cannot take a deep breath.  Having trouble falling asleep or staying asleep, or experiencing restlessness.  Sweating.  Nausea, diarrhea, or irritable bowel syndrome (IBS).  Behavioral symptoms:  Experiencing erratic moods or irritability.  Avoidance of new situations.  Avoidance of people.  Extreme difficulty making decisions.  How is this " diagnosed?  This condition is diagnosed based on your symptoms and medical history. You will also have a physical exam. Your health care provider may perform tests to rule out other possible causes of your symptoms.  To be diagnosed with HERNÁN, a person must have anxiety that:  Is out of his or her control.  Affects several different aspects of his or her life, such as work and relationships.  Causes distress that makes him or her unable to take part in normal activities.  Includes at least three symptoms of HERNÁN, such as restlessness, fatigue, trouble concentrating, irritability, muscle tension, or sleep problems.  Before your health care provider can confirm a diagnosis of HERNÁN, these symptoms must be present more days than they are not, and they must last for 6 months or longer.  How is this treated?  This condition may be treated with:  Medicine. Antidepressant medicine is usually prescribed for long-term daily control. Anti-anxiety medicines may be added in severe cases, especially when panic attacks occur.  Talk therapy (psychotherapy). Certain types of talk therapy can be helpful in treating HERNÁN by providing support, education, and guidance. Options include:  Cognitive behavioral therapy (CBT). People learn coping skills and self-calming techniques to ease their physical symptoms. They learn to identify unrealistic thoughts and behaviors and to replace them with more appropriate thoughts and behaviors.  Acceptance and commitment therapy (ACT). This treatment teaches people how to be mindful as a way to cope with unwanted thoughts and feelings.  Biofeedback. This process trains you to manage your body's response (physiological response) through breathing techniques and relaxation methods. You will work with a therapist while machines are used to monitor your physical symptoms.  Stress management techniques. These include yoga, meditation, and exercise.  A mental health specialist can help determine which treatment  is best for you. Some people see improvement with one type of therapy. However, other people require a combination of therapies.  Follow these instructions at home:  Lifestyle  Maintain a consistent routine and schedule.  Anticipate stressful situations. Create a plan, and allow extra time to work with your plan.  Practice stress management or self-calming techniques that you have learned from your therapist or your health care provider.  General instructions  Take over-the-counter and prescription medicines only as told by your health care provider.  Understand that you are likely to have setbacks. Accept this and be kind to yourself as you persist to take better care of yourself.  Recognize and accept your accomplishments, even if you  them as small.  Keep all follow-up visits as told by your health care provider. This is important.  Contact a health care provider if:  Your symptoms do not get better.  Your symptoms get worse.  You have signs of depression, such as:  A persistently sad or irritable mood.  Loss of enjoyment in activities that used to bring you sienna.  Change in weight or eating.  Changes in sleeping habits.  Avoiding friends or family members.  Loss of energy for normal tasks.  Feelings of guilt or worthlessness.  Get help right away if:  You have serious thoughts about hurting yourself or others.  If you ever feel like you may hurt yourself or others, or have thoughts about taking your own life, get help right away. Go to your nearest emergency department or:  Call your local emergency services (159 in the [place]  Call a suicide crisis helpline, such as the National Suicide Prevention Lifeline at 1-650.927.2324. This is open 24 hours a day in the [place]  Text the Crisis Text Line at 061866 (in the [place]  Summary  Generalized anxiety disorder (HERNÁN) is a mental health condition that involves worry that is not triggered by a specific event.  People with HERNÁN often worry excessively about many things  in their lives, such as their health and family.  HERNÁN may cause symptoms such as restlessness, trouble concentrating, sleep problems, frequent sweating, nausea, diarrhea, headaches, and trembling or muscle twitching.  A mental health specialist can help determine which treatment is best for you. Some people see improvement with one type of therapy. However, other people require a combination of therapies.  This information is not intended to replace advice given to you by your health care provider. Make sure you discuss any questions you have with your health care provider.  Document Revised: 10/07/2020 Document Reviewed: 10/07/2020  Elsevier Patient Education © 2021 [employer]vier Inc.

## 2022-04-22 ENCOUNTER — TELEMEDICINE (OUTPATIENT)
Dept: FAMILY MEDICINE CLINIC | Facility: CLINIC | Age: 22
End: 2022-04-22

## 2022-04-22 DIAGNOSIS — F41.0 PANIC ATTACK: ICD-10-CM

## 2022-04-22 DIAGNOSIS — F41.1 GENERALIZED ANXIETY DISORDER: Primary | ICD-10-CM

## 2022-04-22 PROCEDURE — 99213 OFFICE O/P EST LOW 20 MIN: CPT | Performed by: PHYSICIAN ASSISTANT

## 2022-04-22 RX ORDER — ESCITALOPRAM OXALATE 10 MG/1
TABLET ORAL
Qty: 90 TABLET | Refills: 1 | Status: SHIPPED | OUTPATIENT
Start: 2022-04-22 | End: 2022-07-15 | Stop reason: SDUPTHER

## 2022-04-22 NOTE — PROGRESS NOTES
Subjective   Artemio Muñoz is a 21 y.o. male.   You have chosen to receive care through a telehealth visit.  Do you consent to use a video/audio connection for your medical care today? Yes  BMI Readings from Last 1 Encounters:   03/18/22 19.63 kg/m²       History of Present Illness   Artemio Muñoz male 21 y.o., There were no vitals taken for this visit.  who presents today for follow up of Anxiety and Panic Attacks.  He reports medication is working well, patient desires to continue on Rx, and needs refill. Onset of symptoms was approximately several months ago. He denies current suicidal and homicidal ideation. Risk factors are family history of anxiety and or depression and lifestyle of multiple roles.  Previous treatment includes current Rx. He complains of the following medication side effects:none. The patient declines to go to counseling..  Normal GI again.  Feels like his old self before he developed anxiety in February.  Dose is great at 10 mg has not had any side effects.  Today is follow-up from 3/18/2022 for generalized anxiety diagnosis and have reviewed at this visit his trip to the emergency room for atypical chest pain that was related to panic attack with negative work-up of physical causes on 2/14/2022.  He was also reporting GI upset with anxiety getting diarrhea and nausea.  I did start him on Lexapro and as needed Ativan for panic attacks only.  The following portions of the patient's history were reviewed and updated as appropriate: allergies, current medications, past family history, past medical history, past social history, past surgical history and problem list.    Review of Systems   Constitutional: Negative for fever.   HENT: Negative for nosebleeds and trouble swallowing.    Eyes: Negative for visual disturbance.   Respiratory: Negative for choking and stridor.    Cardiovascular: Negative for chest pain.   Gastrointestinal: Negative for blood in stool.   Endocrine: Negative for  polydipsia.   Genitourinary: Negative for genital sores and hematuria.   Musculoskeletal: Negative for joint swelling.   Skin: Negative for color change and rash.   Allergic/Immunologic: Negative for immunocompromised state.   Neurological: Negative for seizures, facial asymmetry and speech difficulty.   Hematological: Negative for adenopathy.   Psychiatric/Behavioral: Negative for behavioral problems, dysphoric mood, self-injury and suicidal ideas. The patient is not nervous/anxious and is not hyperactive.        Objective   Physical Exam  Vitals and nursing note reviewed.   Constitutional:       General: He is not in acute distress.     Appearance: He is well-developed. He is not diaphoretic.   HENT:      Head: Normocephalic.   Eyes:      Conjunctiva/sclera: Conjunctivae normal.      Pupils: Pupils are equal, round, and reactive to light.   Cardiovascular:      Heart sounds: No murmur heard.  Pulmonary:      Effort: Pulmonary effort is normal.   Musculoskeletal:         General: Normal range of motion.      Cervical back: Normal range of motion and neck supple.   Skin:     General: Skin is warm and dry.      Findings: No rash.   Neurological:      Mental Status: He is alert and oriented to person, place, and time.   Psychiatric:         Mood and Affect: Affect is not inappropriate.         Behavior: Behavior normal.         Thought Content: Thought content normal.         Judgment: Judgment normal.         Assessment/Plan   Diagnoses and all orders for this visit:    1. Generalized anxiety disorder (Primary)    2. Panic attack    Other orders  -     escitalopram (LEXAPRO) 10 MG tablet; one po daily for stress  Dispense: 90 tablet; Refill: 1    has not taken Ativan--- no recent panic attacks Lexapro working well to suppress anxiety and feels like his old self  Plan to keep Lexapro dose at 10 mg working well  Does have Ativan if panic attack due to prior visit to ER February for panic attack  Can increase dose of  breakthrough anxiety patient to message me

## 2022-04-22 NOTE — PATIENT INSTRUCTIONS
"[email or web address]">   Generalized Anxiety Disorder, Adult  Generalized anxiety disorder (HERNÁN) is a mental health condition. Unlike normal worries, anxiety related to HERNÁN is not triggered by a specific event. These worries do not fade or get better with time. HERNÁN interferes with relationships, work, and school.  HERNÁN symptoms can vary from mild to severe. People with severe HERNÁN can have intense waves of anxiety with physical symptoms that are similar to panic attacks.  What are the causes?  The exact cause of HERNÁN is not known, but the following are believed to have an impact:  Differences in natural brain chemicals.  Genes passed down from parents to children.  Differences in the way threats are perceived.  Development during childhood.  Personality.  What increases the risk?  The following factors may make you more likely to develop this condition:  Being female.  Having a family history of anxiety disorders.  Being very shy.  Experiencing very stressful life events, such as the death of a loved one.  Having a very stressful family environment.  What are the signs or symptoms?  People with HERNÁN often worry excessively about many things in their lives, such as their health and family. Symptoms may also include:  Mental and emotional symptoms:  Worrying excessively about natural disasters.  Fear of being late.  Difficulty concentrating.  Fears that others are judging your performance.  Physical symptoms:  Fatigue.  Headaches, muscle tension, muscle twitches, trembling, or feeling shaky.  Feeling like your heart is pounding or beating very fast.  Feeling out of breath or like you cannot take a deep breath.  Having trouble falling asleep or staying asleep, or experiencing restlessness.  Sweating.  Nausea, diarrhea, or irritable bowel syndrome (IBS).  Behavioral symptoms:  Experiencing erratic moods or irritability.  Avoidance of new situations.  Avoidance of people.  Extreme difficulty making decisions.  How is this " diagnosed?  This condition is diagnosed based on your symptoms and medical history. You will also have a physical exam. Your health care provider may perform tests to rule out other possible causes of your symptoms.  To be diagnosed with HERNÁN, a person must have anxiety that:  Is out of his or her control.  Affects several different aspects of his or her life, such as work and relationships.  Causes distress that makes him or her unable to take part in normal activities.  Includes at least three symptoms of HERNÁN, such as restlessness, fatigue, trouble concentrating, irritability, muscle tension, or sleep problems.  Before your health care provider can confirm a diagnosis of HERNÁN, these symptoms must be present more days than they are not, and they must last for 6 months or longer.  How is this treated?  This condition may be treated with:  Medicine. Antidepressant medicine is usually prescribed for long-term daily control. Anti-anxiety medicines may be added in severe cases, especially when panic attacks occur.  Talk therapy (psychotherapy). Certain types of talk therapy can be helpful in treating HERNÁN by providing support, education, and guidance. Options include:  Cognitive behavioral therapy (CBT). People learn coping skills and self-calming techniques to ease their physical symptoms. They learn to identify unrealistic thoughts and behaviors and to replace them with more appropriate thoughts and behaviors.  Acceptance and commitment therapy (ACT). This treatment teaches people how to be mindful as a way to cope with unwanted thoughts and feelings.  Biofeedback. This process trains you to manage your body's response (physiological response) through breathing techniques and relaxation methods. You will work with a therapist while machines are used to monitor your physical symptoms.  Stress management techniques. These include yoga, meditation, and exercise.  A mental health specialist can help determine which treatment  is best for you. Some people see improvement with one type of therapy. However, other people require a combination of therapies.  Follow these instructions at home:  Lifestyle  Maintain a consistent routine and schedule.  Anticipate stressful situations. Create a plan, and allow extra time to work with your plan.  Practice stress management or self-calming techniques that you have learned from your therapist or your health care provider.  General instructions  Take over-the-counter and prescription medicines only as told by your health care provider.  Understand that you are likely to have setbacks. Accept this and be kind to yourself as you persist to take better care of yourself.  Recognize and accept your accomplishments, even if you  them as small.  Keep all follow-up visits as told by your health care provider. This is important.  Contact a health care provider if:  Your symptoms do not get better.  Your symptoms get worse.  You have signs of depression, such as:  A persistently sad or irritable mood.  Loss of enjoyment in activities that used to bring you sienna.  Change in weight or eating.  Changes in sleeping habits.  Avoiding friends or family members.  Loss of energy for normal tasks.  Feelings of guilt or worthlessness.  Get help right away if:  You have serious thoughts about hurting yourself or others.  If you ever feel like you may hurt yourself or others, or have thoughts about taking your own life, get help right away. Go to your nearest emergency department or:  Call your local emergency services (472 in the [place]  Call a suicide crisis helpline, such as the National Suicide Prevention Lifeline at 1-826.887.1670. This is open 24 hours a day in the [place]  Text the Crisis Text Line at 430366 (in the [place]  Summary  Generalized anxiety disorder (HERNÁN) is a mental health condition that involves worry that is not triggered by a specific event.  People with HERNÁN often worry excessively about many things  in their lives, such as their health and family.  HERNÁN may cause symptoms such as restlessness, trouble concentrating, sleep problems, frequent sweating, nausea, diarrhea, headaches, and trembling or muscle twitching.  A mental health specialist can help determine which treatment is best for you. Some people see improvement with one type of therapy. However, other people require a combination of therapies.  This information is not intended to replace advice given to you by your health care provider. Make sure you discuss any questions you have with your health care provider.  Document Revised: 10/07/2020 Document Reviewed: 10/07/2020  Elsevier Patient Education © 2021 Elsevier Inc.

## 2022-06-23 RX ORDER — AZITHROMYCIN 250 MG/1
TABLET, FILM COATED ORAL
Qty: 6 TABLET | Refills: 1 | Status: SHIPPED | OUTPATIENT
Start: 2022-06-23 | End: 2022-07-05

## 2022-06-30 ENCOUNTER — HOSPITAL ENCOUNTER (EMERGENCY)
Facility: HOSPITAL | Age: 22
Discharge: HOME | End: 2022-06-30
Attending: EMERGENCY MEDICINE | Admitting: EMERGENCY MEDICINE

## 2022-06-30 VITALS
DIASTOLIC BLOOD PRESSURE: 76 MMHG | WEIGHT: 133 LBS | TEMPERATURE: 98.6 F | OXYGEN SATURATION: 100 % | RESPIRATION RATE: 16 BRPM | BODY MASS INDEX: 19.7 KG/M2 | SYSTOLIC BLOOD PRESSURE: 123 MMHG | HEART RATE: 70 BPM | HEIGHT: 69 IN

## 2022-06-30 DIAGNOSIS — R79.89 LFT ELEVATION: Primary | ICD-10-CM

## 2022-06-30 DIAGNOSIS — R79.89 ABNORMAL CBC: ICD-10-CM

## 2022-06-30 DIAGNOSIS — L50.9 HIVES: Primary | ICD-10-CM

## 2022-06-30 DIAGNOSIS — R74.8 ELEVATED LIVER ENZYMES: ICD-10-CM

## 2022-06-30 DIAGNOSIS — L50.9 URTICARIA: ICD-10-CM

## 2022-06-30 LAB
ALBUMIN SERPL-MCNC: 4.1 G/DL (ref 3.5–5.2)
ALBUMIN/GLOB SERPL: 1.6 G/DL
ALP SERPL-CCNC: 150 U/L (ref 39–117)
ALT SERPL W P-5'-P-CCNC: 344 U/L (ref 1–41)
ANION GAP SERPL CALCULATED.3IONS-SCNC: 10 MMOL/L (ref 5–15)
AST SERPL-CCNC: 153 U/L (ref 1–40)
BILIRUB SERPL-MCNC: 1.2 MG/DL (ref 0–1.2)
BUN SERPL-MCNC: 12 MG/DL (ref 6–20)
BUN/CREAT SERPL: 16.7 (ref 7–25)
CALCIUM SPEC-SCNC: 8.3 MG/DL (ref 8.6–10.5)
CHLORIDE SERPL-SCNC: 101 MMOL/L (ref 98–107)
CO2 SERPL-SCNC: 27 MMOL/L (ref 22–29)
CREAT SERPL-MCNC: 0.72 MG/DL (ref 0.76–1.27)
DEPRECATED RDW RBC AUTO: 40.3 FL (ref 37–54)
EGFRCR SERPLBLD CKD-EPI 2021: 132.5 ML/MIN/1.73
ERYTHROCYTE [DISTWIDTH] IN BLOOD BY AUTOMATED COUNT: 12.6 % (ref 12.3–15.4)
GLOBULIN UR ELPH-MCNC: 2.6 GM/DL
GLUCOSE SERPL-MCNC: 97 MG/DL (ref 65–99)
HCT VFR BLD AUTO: 33.4 % (ref 37.5–51)
HGB BLD-MCNC: 11.9 G/DL (ref 13–17.7)
LYMPHOCYTES # BLD MANUAL: 7.57 10*3/MM3 (ref 0.7–3.1)
LYMPHOCYTES NFR BLD MANUAL: 13.1 % (ref 5–12)
MCH RBC QN AUTO: 32.2 PG (ref 26.6–33)
MCHC RBC AUTO-ENTMCNC: 35.6 G/DL (ref 31.5–35.7)
MCV RBC AUTO: 90.3 FL (ref 79–97)
MONOCYTES # BLD: 1.33 10*3/MM3 (ref 0.1–0.9)
NEUTROPHILS # BLD AUTO: 1.23 10*3/MM3 (ref 1.7–7)
NEUTROPHILS NFR BLD MANUAL: 12.1 % (ref 42.7–76)
PLAT MORPH BLD: NORMAL
PLATELET # BLD AUTO: 202 10*3/MM3 (ref 140–450)
PMV BLD AUTO: 9.1 FL (ref 6–12)
POLYCHROMASIA BLD QL SMEAR: ABNORMAL
POTASSIUM SERPL-SCNC: 4.4 MMOL/L (ref 3.5–5.2)
PROT SERPL-MCNC: 6.7 G/DL (ref 6–8.5)
RBC # BLD AUTO: 3.7 10*6/MM3 (ref 4.14–5.8)
SODIUM SERPL-SCNC: 138 MMOL/L (ref 136–145)
VARIANT LYMPHS NFR BLD MANUAL: 4 % (ref 0–5)
VARIANT LYMPHS NFR BLD MANUAL: 70.7 % (ref 19.6–45.3)
WBC MORPH BLD: NORMAL
WBC NRBC COR # BLD: 10.14 10*3/MM3 (ref 3.4–10.8)

## 2022-06-30 PROCEDURE — 96374 THER/PROPH/DIAG INJ IV PUSH: CPT

## 2022-06-30 PROCEDURE — 99283 EMERGENCY DEPT VISIT LOW MDM: CPT

## 2022-06-30 PROCEDURE — 25010000002 METHYLPREDNISOLONE PER 125 MG: Performed by: EMERGENCY MEDICINE

## 2022-06-30 PROCEDURE — 36415 COLL VENOUS BLD VENIPUNCTURE: CPT

## 2022-06-30 PROCEDURE — 85007 BL SMEAR W/DIFF WBC COUNT: CPT | Performed by: EMERGENCY MEDICINE

## 2022-06-30 PROCEDURE — 85025 COMPLETE CBC W/AUTO DIFF WBC: CPT | Performed by: EMERGENCY MEDICINE

## 2022-06-30 PROCEDURE — 96376 TX/PRO/DX INJ SAME DRUG ADON: CPT

## 2022-06-30 PROCEDURE — 96375 TX/PRO/DX INJ NEW DRUG ADDON: CPT

## 2022-06-30 PROCEDURE — 80053 COMPREHEN METABOLIC PANEL: CPT | Performed by: EMERGENCY MEDICINE

## 2022-06-30 PROCEDURE — 25010000002 DIPHENHYDRAMINE PER 50 MG: Performed by: EMERGENCY MEDICINE

## 2022-06-30 RX ORDER — DIPHENHYDRAMINE HYDROCHLORIDE 50 MG/ML
25 INJECTION INTRAMUSCULAR; INTRAVENOUS ONCE
Status: COMPLETED | OUTPATIENT
Start: 2022-06-30 | End: 2022-06-30

## 2022-06-30 RX ORDER — METHYLPREDNISOLONE SODIUM SUCCINATE 125 MG/2ML
125 INJECTION, POWDER, LYOPHILIZED, FOR SOLUTION INTRAMUSCULAR; INTRAVENOUS ONCE
Status: COMPLETED | OUTPATIENT
Start: 2022-06-30 | End: 2022-06-30

## 2022-06-30 RX ORDER — FAMOTIDINE 10 MG/ML
20 INJECTION, SOLUTION INTRAVENOUS ONCE
Status: COMPLETED | OUTPATIENT
Start: 2022-06-30 | End: 2022-06-30

## 2022-06-30 RX ORDER — SODIUM CHLORIDE 0.9 % (FLUSH) 0.9 %
10 SYRINGE (ML) INJECTION AS NEEDED
Status: DISCONTINUED | OUTPATIENT
Start: 2022-06-30 | End: 2022-06-30 | Stop reason: HOSPADM

## 2022-06-30 RX ORDER — DIPHENHYDRAMINE HCL 25 MG
25 TABLET ORAL EVERY 6 HOURS PRN
Qty: 30 TABLET | Refills: 0 | Status: SHIPPED | OUTPATIENT
Start: 2022-06-30 | End: 2022-07-05

## 2022-06-30 RX ORDER — METHYLPREDNISOLONE 4 MG/1
TABLET ORAL
Qty: 21 TABLET | Refills: 0 | Status: SHIPPED | OUTPATIENT
Start: 2022-06-30 | End: 2022-07-05

## 2022-06-30 RX ADMIN — DIPHENHYDRAMINE HYDROCHLORIDE 25 MG: 50 INJECTION, SOLUTION INTRAMUSCULAR; INTRAVENOUS at 04:23

## 2022-06-30 RX ADMIN — METHYLPREDNISOLONE SODIUM SUCCINATE 125 MG: 125 INJECTION, POWDER, FOR SOLUTION INTRAMUSCULAR; INTRAVENOUS at 03:27

## 2022-06-30 RX ADMIN — FAMOTIDINE 20 MG: 10 INJECTION INTRAVENOUS at 04:33

## 2022-06-30 RX ADMIN — Medication 20 MG: at 03:28

## 2022-06-30 RX ADMIN — SODIUM CHLORIDE 1000 ML: 9 INJECTION, SOLUTION INTRAVENOUS at 03:26

## 2022-06-30 RX ADMIN — DIPHENHYDRAMINE HYDROCHLORIDE 25 MG: 50 INJECTION, SOLUTION INTRAMUSCULAR; INTRAVENOUS at 03:28

## 2022-07-02 LAB
ALBUMIN SERPL-MCNC: 4.2 G/DL (ref 4.1–5.2)
ALBUMIN/GLOB SERPL: 1.5 {RATIO} (ref 1.2–2.2)
ALP SERPL-CCNC: 140 IU/L (ref 44–121)
ALT SERPL-CCNC: 274 IU/L (ref 0–44)
AST SERPL-CCNC: 89 IU/L (ref 0–40)
BASOPHILS # BLD AUTO: 0 X10E3/UL (ref 0–0.2)
BASOPHILS NFR BLD AUTO: 0 %
BILIRUB SERPL-MCNC: 1.2 MG/DL (ref 0–1.2)
BUN SERPL-MCNC: 13 MG/DL (ref 6–20)
BUN/CREAT SERPL: 16 (ref 9–20)
CALCIUM SERPL-MCNC: 8.8 MG/DL (ref 8.7–10.2)
CHLORIDE SERPL-SCNC: 101 MMOL/L (ref 96–106)
CO2 SERPL-SCNC: 26 MMOL/L (ref 20–29)
CREAT SERPL-MCNC: 0.83 MG/DL (ref 0.76–1.27)
EBV NA IGG SER IA-ACNC: <18 U/ML (ref 0–17.9)
EBV VCA IGG SER IA-ACNC: 64 U/ML (ref 0–17.9)
EBV VCA IGM SER IA-ACNC: >160 U/ML (ref 0–35.9)
EGFRCR SERPLBLD CKD-EPI 2021: 127 ML/MIN/1.73
EOSINOPHIL # BLD AUTO: 0 X10E3/UL (ref 0–0.4)
EOSINOPHIL NFR BLD AUTO: 0 %
ERYTHROCYTE [DISTWIDTH] IN BLOOD BY AUTOMATED COUNT: 15.2 % (ref 11.6–15.4)
GLOBULIN SER CALC-MCNC: 2.8 G/DL (ref 1.5–4.5)
GLUCOSE SERPL-MCNC: 74 MG/DL (ref 65–99)
HCT VFR BLD AUTO: 30.5 % (ref 37.5–51)
HGB BLD-MCNC: 11.6 G/DL (ref 13–17.7)
IMMATURE CELLS: ABNORMAL
LYMPHOCYTES # BLD AUTO: 3.9 X10E3/UL (ref 0.7–3.1)
LYMPHOCYTES NFR BLD AUTO: 54 %
MCH RBC QN AUTO: 37.8 PG (ref 26.6–33)
MCHC RBC AUTO-ENTMCNC: 38 G/DL (ref 31.5–35.7)
MCV RBC AUTO: 99 FL (ref 79–97)
MONOCYTES # BLD AUTO: 0.6 X10E3/UL (ref 0.1–0.9)
MONOCYTES NFR BLD AUTO: 8 %
MORPHOLOGY BLD-IMP: ABNORMAL
MYELOCYTES NFR BLD: 5 % (ref 0–0)
NEUTROPHILS # BLD AUTO: 2.4 X10E3/UL (ref 1.4–7)
NEUTROPHILS NFR BLD AUTO: 33 %
PLATELET # BLD AUTO: 195 X10E3/UL (ref 150–450)
POTASSIUM SERPL-SCNC: 3.4 MMOL/L (ref 3.5–5.2)
PROT SERPL-MCNC: 7 G/DL (ref 6–8.5)
RBC # BLD AUTO: 3.07 X10E6/UL (ref 4.14–5.8)
SERVICE CMNT-IMP: ABNORMAL
SODIUM SERPL-SCNC: 141 MMOL/L (ref 134–144)
WBC # BLD AUTO: 7.2 X10E3/UL (ref 3.4–10.8)

## 2022-07-05 ENCOUNTER — OFFICE VISIT (OUTPATIENT)
Dept: FAMILY MEDICINE CLINIC | Facility: CLINIC | Age: 22
End: 2022-07-05

## 2022-07-05 VITALS
OXYGEN SATURATION: 98 % | HEIGHT: 69 IN | WEIGHT: 128 LBS | DIASTOLIC BLOOD PRESSURE: 80 MMHG | TEMPERATURE: 98 F | HEART RATE: 81 BPM | RESPIRATION RATE: 18 BRPM | BODY MASS INDEX: 18.96 KG/M2 | SYSTOLIC BLOOD PRESSURE: 122 MMHG

## 2022-07-05 DIAGNOSIS — R79.89 LFT ELEVATION: ICD-10-CM

## 2022-07-05 DIAGNOSIS — R79.89 ABNORMAL CBC: ICD-10-CM

## 2022-07-05 DIAGNOSIS — B27.90 EPSTEIN BARR INFECTION: ICD-10-CM

## 2022-07-05 DIAGNOSIS — Z09 HOSPITAL DISCHARGE FOLLOW-UP: Primary | ICD-10-CM

## 2022-07-05 PROCEDURE — 99214 OFFICE O/P EST MOD 30 MIN: CPT | Performed by: NURSE PRACTITIONER

## 2022-07-05 RX ORDER — CETIRIZINE HYDROCHLORIDE 10 MG/1
10 TABLET ORAL DAILY
COMMUNITY

## 2022-07-05 NOTE — PROGRESS NOTES
Chief Complaint  Hospital Follow Up Visit (Hives 6-30-22)    Subjective          Artemio presents to [facility]  For follow up post discharge from the [facility]  on 6/30/2022    21 year old male presented to the ER for Hives and an allergic reaction post starting Augmentin for tonsillitis on 6/23/2022 at [facility].    He had never taken this medication in the past that he is aware of.    He was diagnosed with MONO lucas Enamorado virus   Has had some elevated Liver enzymes-  They did decrease over the recheck on 7/1/2022  Potassium slightly low-  Increasing his intake of orange juice and bananas  Will recheck labs in 2 weeks to follow trend    He is still feeling very tired.    His throat is feeling better, he no longer has any pain, and all other hives and itching has resoved post discharge.  He has not had any other c/o. No abd pain, no N/V/D    Plans to rest and work from home this week  Knows to avoid contact sports.      No other c/o today        HPI FROM ER on 6/30/2022  HPI:  Chief Complaint: Rash itching  A complete HPI/ROS/PMH/PSH/SH/FH are unobtainable due to: None     Context: Artemio Muñoz is a 21 y.o. male who presents to the ED c/o urticaria and hives to chest and bilateral forearms.  Patient woke up with the symptoms.  States that about a week ago he had similar symptoms that take amoxicillin.  He is just finished his course of steroids.  Patient reports mild shortness of breath.  Denies any chest pain no difficulty speaking or swallowing    The following portions of the patient's history were reviewed and updated as appropriate: allergies, current medications, past family history, past medical history, past social history, past surgical history, and problem list    Review of Systems   Constitutional: Positive for fatigue. Negative for chills and fever.   HENT: Negative for sore throat.    Eyes: Negative for visual disturbance.   Respiratory: Negative  "for cough, shortness of breath and wheezing.    Cardiovascular: Negative for chest pain, palpitations and leg swelling.   Gastrointestinal: Negative for abdominal pain, constipation, diarrhea, nausea and vomiting.   Skin: Negative for rash.   Neurological: Negative for dizziness and light-headedness.         Objective   Vital Signs:   Vitals:    07/05/22 0927   BP: 122/80   Pulse: 81   Resp: 18   Temp: 98 °F (36.7 °C)   SpO2: 98%   Weight: 58.1 kg (128 lb)   Height: 175 cm (68.9\")        BMI is within normal parameters. No other follow-up for BMI required.        Physical Exam  Vitals reviewed.   Constitutional:       General: He is not in acute distress.  Eyes:      Conjunctiva/sclera: Conjunctivae normal.   Neck:      Thyroid: No thyromegaly.      Vascular: No carotid bruit.   Cardiovascular:      Rate and Rhythm: Normal rate and regular rhythm.      Heart sounds: Normal heart sounds.   Pulmonary:      Effort: Pulmonary effort is normal.      Breath sounds: Normal breath sounds.   Abdominal:      General: Abdomen is flat. Bowel sounds are normal. There is no distension.      Palpations: Abdomen is soft. There is no mass.      Tenderness: There is no abdominal tenderness. There is no right CVA tenderness, left CVA tenderness, guarding or rebound.      Hernia: No hernia is present.   Neurological:      Mental Status: He is alert.   Psychiatric:         Attention and Perception: Attention normal.         Mood and Affect: Mood normal.          Result Review :     The following data was reviewed by: CONRADO Gonzalez on 07/05/2022:        Recent Results (from the past 24 hour(s))   Comprehensive Metabolic Panel     Collection Time: 06/30/22  4:20 AM     Specimen: Blood   Result Value Ref Range     Glucose 97 65 - 99 mg/dL     BUN 12 6 - 20 mg/dL     Creatinine 0.72 (L) 0.76 - 1.27 mg/dL     Sodium 138 136 - 145 mmol/L     Potassium 4.4 3.5 - 5.2 mmol/L     Chloride 101 98 - 107 mmol/L     CO2 27.0 22.0 - 29.0 " mmol/L     Calcium 8.3 (L) 8.6 - 10.5 mg/dL     Total Protein 6.7 6.0 - 8.5 g/dL     Albumin 4.10 3.50 - 5.20 g/dL     ALT (SGPT) 344 (H) 1 - 41 U/L     AST (SGOT) 153 (H) 1 - 40 U/L     Alkaline Phosphatase 150 (H) 39 - 117 U/L     Total Bilirubin 1.2 0.0 - 1.2 mg/dL     Globulin 2.6 gm/dL     A/G Ratio 1.6 g/dL     BUN/Creatinine Ratio 16.7 7.0 - 25.0     Anion Gap 10.0 5.0 - 15.0 mmol/L     eGFR 132.5 >60.0 mL/min/1.73   CBC Auto Differential     Collection Time: 06/30/22  4:20 AM     Specimen: Blood   Result Value Ref Range     WBC 10.14 3.40 - 10.80 10*3/mm3     RBC 3.70 (L) 4.14 - 5.80 10*6/mm3     Hemoglobin 11.9 (L) 13.0 - 17.7 g/dL     Hematocrit 33.4 (L) 37.5 - 51.0 %     MCV 90.3 79.0 - 97.0 fL     MCH 32.2 26.6 - 33.0 pg     MCHC 35.6 31.5 - 35.7 g/dL     RDW 12.6 12.3 - 15.4 %     RDW-SD 40.3 37.0 - 54.0 fl     MPV 9.1 6.0 - 12.0 fL     Platelets 202 140 - 450 10*3/mm3   Manual Differential     Collection Time: 06/30/22  4:20 AM     Specimen: Blood   Result Value Ref Range     Neutrophil % 12.1 (L) 42.7 - 76.0 %     Lymphocyte % 70.7 (H) 19.6 - 45.3 %     Monocyte % 13.1 (H) 5.0 - 12.0 %     Atypical Lymphocyte % 4.0 0.0 - 5.0 %     Neutrophils Absolute 1.23 (L) 1.70 - 7.00 10*3/mm3     Lymphocytes Absolute 7.57 (H) 0.70 - 3.10 10*3/mm3     Monocytes Absolute 1.33 (H) 0.10 - 0.90 10*3/mm3     Polychromasia Mod/2+ None Seen     WBC Morphology Normal Normal     Platelet Morphology Normal Normal          Assessment and Plan    Diagnoses and all orders for this visit:    1. Hospital discharge follow-up (Primary)  Comments:  Grisel Laura 6/30/2022-  Medication allergic reaction    2. Hailey Enamorado infection  Comments:  rest  avoid contact sports    3. LFT elevation  Comments:  recheck 2 weeks  trending down  Orders:  -     Comprehensive Metabolic Panel  -     CBC & Differential    4. Abnormal CBC  Comments:  recheck 2 weeks  trending- with mono  Orders:  -     Comprehensive Metabolic Panel  -     CBC &  Differential        Follow Up   Return if symptoms worsen or fail to improve, for recheck labs 2 weeks.  Patient was given instructions and counseling regarding his condition or for health maintenance advice. Please see specific information pulled into the AVS if appropriate.

## 2022-07-18 RX ORDER — ESCITALOPRAM OXALATE 10 MG/1
TABLET ORAL
Qty: 90 TABLET | Refills: 0 | Status: SHIPPED | OUTPATIENT
Start: 2022-07-18 | End: 2022-07-21

## 2022-07-21 ENCOUNTER — OFFICE VISIT (OUTPATIENT)
Dept: FAMILY MEDICINE CLINIC | Facility: CLINIC | Age: 22
End: 2022-07-21

## 2022-07-21 VITALS
SYSTOLIC BLOOD PRESSURE: 110 MMHG | BODY MASS INDEX: 19.11 KG/M2 | DIASTOLIC BLOOD PRESSURE: 62 MMHG | WEIGHT: 129 LBS | OXYGEN SATURATION: 99 % | HEART RATE: 75 BPM | HEIGHT: 69 IN | TEMPERATURE: 97.5 F | RESPIRATION RATE: 16 BRPM

## 2022-07-21 DIAGNOSIS — F41.0 PANIC ATTACK: ICD-10-CM

## 2022-07-21 DIAGNOSIS — R79.89 LFT ELEVATION: ICD-10-CM

## 2022-07-21 DIAGNOSIS — F41.1 GENERALIZED ANXIETY DISORDER: Primary | ICD-10-CM

## 2022-07-21 DIAGNOSIS — B27.90 EPSTEIN BARR INFECTION: ICD-10-CM

## 2022-07-21 DIAGNOSIS — L50.9 URTICARIA: ICD-10-CM

## 2022-07-21 PROCEDURE — 99213 OFFICE O/P EST LOW 20 MIN: CPT | Performed by: PHYSICIAN ASSISTANT

## 2022-07-21 RX ORDER — ESCITALOPRAM OXALATE 20 MG/1
20 TABLET ORAL DAILY
Qty: 90 TABLET | Refills: 1 | Status: SHIPPED | OUTPATIENT
Start: 2022-07-21 | End: 2022-11-13 | Stop reason: SDUPTHER

## 2022-07-21 NOTE — PROGRESS NOTES
"Subjective   Artemio Muñoz is a 21 y.o. male.     History of Present Illness   Artemio Muñoz male 21 y.o., /62 (BP Location: Left arm, Patient Position: Sitting, Cuff Size: Adult)   Pulse 75   Temp 97.5 °F (36.4 °C)   Resp 16   Ht 175 cm (68.9\")   Wt 58.5 kg (129 lb)   SpO2 99%   BMI 19.11 kg/m²   who presents today for follow up of Anxiety and Panic Attacks.  He reports medication is working well but is having break through anxiety daily in last 10 days---ready to go up on dose..  Med has helped.  Onset of symptoms was approximately several months ago. He denies current suicidal and homicidal ideation. Risk factors are family history of anxiety and or depression and lifestyle of multiple roles.  Previous treatment includes current Rx. He complains of the following medication side effects:none. The patient has had no previous counseling..    Saw Liz f/u mono and elevated LFTs; Need labs  I have him to see allergist for hives---kept having    Did stop Zyrtec and Pepcid for allergy testing  1 more week of physical activity     Results for orders placed or performed in visit on 06/30/22   Comprehensive Metabolic Panel    Specimen: Blood   Result Value Ref Range    Glucose 74 65 - 99 mg/dL    BUN 13 6 - 20 mg/dL    Creatinine 0.83 0.76 - 1.27 mg/dL    EGFR Result 127 >59 mL/min/1.73    BUN/Creatinine Ratio 16 9 - 20    Sodium 141 134 - 144 mmol/L    Potassium 3.4 (L) 3.5 - 5.2 mmol/L    Chloride 101 96 - 106 mmol/L    Total CO2 26 20 - 29 mmol/L    Calcium 8.8 8.7 - 10.2 mg/dL    Total Protein 7.0 6.0 - 8.5 g/dL    Albumin 4.2 4.1 - 5.2 g/dL    Globulin 2.8 1.5 - 4.5 g/dL    A/G Ratio 1.5 1.2 - 2.2    Total Bilirubin 1.2 0.0 - 1.2 mg/dL    Alkaline Phosphatase 140 (H) 44 - 121 IU/L    AST (SGOT) 89 (H) 0 - 40 IU/L    ALT (SGPT) 274 (H) 0 - 44 IU/L   EBV Antibody Profile    Specimen: Blood   Result Value Ref Range    EBV VCA IgM >160.0 (H) 0.0 - 35.9 U/mL    EBV VCA IgG 64.0 (H) 0.0 - 17.9 U/mL    EBV " Nuclear Antigen Ab, IgG <18.0 0.0 - 17.9 U/mL    Interpretation Comment    CBC & Differential    Specimen: Blood   Result Value Ref Range    WBC 7.2 3.4 - 10.8 x10E3/uL    RBC 3.07 (L) 4.14 - 5.80 x10E6/uL    Hemoglobin 11.6 (L) 13.0 - 17.7 g/dL    Hematocrit 30.5 (L) 37.5 - 51.0 %    MCV 99 (H) 79 - 97 fL    MCH 37.8 (H) 26.6 - 33.0 pg    MCHC 38.0 (H) 31.5 - 35.7 g/dL    RDW 15.2 11.6 - 15.4 %    Platelets 195 150 - 450 x10E3/uL    Neutrophil Rel % 33 Not Estab. %    Lymphocyte Rel % 54 Not Estab. %    Monocyte Rel % 8 Not Estab. %    Eosinophil Rel % 0 Not Estab. %    Basophil Rel % 0 Not Estab. %    Immature Cells Note     Neutrophils Absolute 2.4 1.4 - 7.0 x10E3/uL    Lymphocytes Absolute 3.9 (H) 0.7 - 3.1 x10E3/uL    Monocytes Absolute 0.6 0.1 - 0.9 x10E3/uL    Eosinophils Absolute 0.0 0.0 - 0.4 x10E3/uL    Basophils Absolute 0.0 0.0 - 0.2 x10E3/uL    Hematology Comments: Note:    Immature Cells   Result Value Ref Range    Myelocyte Rel % 5 (H) 0 - 0 %       Hives----to see Dr Lucero today    Result QuickNote - Pedro Ferrara MD - 03/11/2022 4:37 PM EST  Formatting of this note might be different from the original.  Stress test is completely normal indicating the small connection between his heart and lung arteries is not causing any difficulty in his heart pumping or receiving blood even during maximum exercise. Given the normal stress test along with the normal echo and Holter, there is no reason to believe his chest pain is from an abnormal heart condition. The finding on the CAT scan is incidental and does not require any further evaluation.     Electronically signed by Pedro Ferrara MD at 03/11/2022 4:37 PM EST    Saw cardiologist Dr. Ferrara on 2/17/2022 also noted prior echocardiogram normal.  Audiologist notes patient was very anxious at appointment and had tachycardia associated with this but sinus tachycardia.  And dizziness.  RESULTS:   EKG reviewed by me and shows sinus tachycardia with voltage  criteria for LVH     Signed Prescriptions Disp Refills   • metoprolol (LOPRESSOR) 50 MG tablet 4 tablet 0   Sig: Take one tab (50mg) twice a day the day prior to heart CT and on the day of CT, take two tabs (100mg) 2 hours prior to CT scan appointment.   Notes above from his stress test ordered at this visit and performed 3/11/2022 and negative  The following portions of the patient's history were reviewed and updated as appropriate: allergies, current medications, past family history, past medical history, past social history, past surgical history and problem list.    Review of Systems   Constitutional: Negative for activity change, appetite change and unexpected weight change.   HENT: Negative for nosebleeds and trouble swallowing.    Eyes: Negative for pain and visual disturbance.   Respiratory: Negative for chest tightness, shortness of breath and wheezing.    Cardiovascular: Negative for chest pain and palpitations.   Gastrointestinal: Negative for abdominal pain and blood in stool.   Endocrine: Negative.    Genitourinary: Negative for difficulty urinating and hematuria.   Musculoskeletal: Negative for joint swelling.   Skin: Negative for color change and rash.   Allergic/Immunologic: Negative.    Neurological: Negative for syncope and speech difficulty.   Hematological: Negative for adenopathy.   Psychiatric/Behavioral: Negative for agitation and confusion. The patient is nervous/anxious.    All other systems reviewed and are negative.      Objective   Physical Exam  Vitals and nursing note reviewed.   Constitutional:       General: He is not in acute distress.     Appearance: Normal appearance. He is well-developed. He is not diaphoretic.   HENT:      Head: Normocephalic.      Right Ear: External ear normal.      Left Ear: External ear normal.   Eyes:      General: No scleral icterus.     Conjunctiva/sclera: Conjunctivae normal.      Pupils: Pupils are equal, round, and reactive to light.   Cardiovascular:       Rate and Rhythm: Normal rate and regular rhythm.      Pulses: Normal pulses.      Heart sounds: Normal heart sounds. No murmur heard.  Pulmonary:      Effort: Pulmonary effort is normal.      Breath sounds: Normal breath sounds.   Abdominal:      General: Bowel sounds are normal.      Palpations: Abdomen is soft. There is no mass.      Tenderness: There is no abdominal tenderness.      Comments: No HSM   Musculoskeletal:         General: Normal range of motion.      Cervical back: Normal range of motion and neck supple.      Right lower leg: No edema.      Left lower leg: No edema.   Skin:     General: Skin is warm and dry.      Findings: No rash.   Neurological:      General: No focal deficit present.      Mental Status: He is alert and oriented to person, place, and time.   Psychiatric:         Mood and Affect: Mood normal. Affect is not inappropriate.         Behavior: Behavior normal.         Thought Content: Thought content normal.         Judgment: Judgment normal.         Assessment & Plan   Diagnoses and all orders for this visit:    1. Generalized anxiety disorder (Primary)    2. Panic attack    3. Urticaria    4. LFT elevation  -     Comprehensive Metabolic Panel  -     CBC & Differential    5. Hailey Enamorado infection  -     Comprehensive Metabolic Panel  -     CBC & Differential    Other orders  -     escitalopram (Lexapro) 20 MG tablet; Take 1 tablet by mouth Daily. For anxiety  Dispense: 90 tablet; Refill: 1      1 more week of avoid trauma to abd d/t mono  To see allergist today  CMP and CBC today--need make sure back in range  Raise Lexapro for anxiety ---break through; is helping  Ativan if panic works         Answers for HPI/ROS submitted by the patient on 7/18/2022  What is the primary reason for your visit?: Other  Please describe your symptoms.: Follow up of liver enzymes as well as discussing anxiety prescription  Have you had these symptoms before?: Yes  How long have you been having these  symptoms?: Greater than 2 weeks

## 2022-07-22 LAB
ALBUMIN SERPL-MCNC: 5 G/DL (ref 4.1–5.2)
ALBUMIN/GLOB SERPL: 1.7 {RATIO} (ref 1.2–2.2)
ALP SERPL-CCNC: 101 IU/L (ref 44–121)
ALT SERPL-CCNC: 88 IU/L (ref 0–44)
AST SERPL-CCNC: 51 IU/L (ref 0–40)
BASOPHILS # BLD AUTO: 0 X10E3/UL (ref 0–0.2)
BASOPHILS NFR BLD AUTO: 1 %
BILIRUB SERPL-MCNC: 0.8 MG/DL (ref 0–1.2)
BUN SERPL-MCNC: 10 MG/DL (ref 6–20)
BUN/CREAT SERPL: 12 (ref 9–20)
CALCIUM SERPL-MCNC: 9.8 MG/DL (ref 8.7–10.2)
CHLORIDE SERPL-SCNC: 99 MMOL/L (ref 96–106)
CO2 SERPL-SCNC: 25 MMOL/L (ref 20–29)
CREAT SERPL-MCNC: 0.85 MG/DL (ref 0.76–1.27)
EGFRCR SERPLBLD CKD-EPI 2021: 126 ML/MIN/1.73
EOSINOPHIL # BLD AUTO: 0.1 X10E3/UL (ref 0–0.4)
EOSINOPHIL NFR BLD AUTO: 1 %
ERYTHROCYTE [DISTWIDTH] IN BLOOD BY AUTOMATED COUNT: 13.2 % (ref 11.6–15.4)
GLOBULIN SER CALC-MCNC: 3 G/DL (ref 1.5–4.5)
GLUCOSE SERPL-MCNC: 76 MG/DL (ref 65–99)
HCT VFR BLD AUTO: 43.3 % (ref 37.5–51)
HGB BLD-MCNC: 14.8 G/DL (ref 13–17.7)
IMM GRANULOCYTES # BLD AUTO: 0 X10E3/UL (ref 0–0.1)
IMM GRANULOCYTES NFR BLD AUTO: 0 %
LYMPHOCYTES # BLD AUTO: 1.9 X10E3/UL (ref 0.7–3.1)
LYMPHOCYTES NFR BLD AUTO: 36 %
MCH RBC QN AUTO: 31.2 PG (ref 26.6–33)
MCHC RBC AUTO-ENTMCNC: 34.2 G/DL (ref 31.5–35.7)
MCV RBC AUTO: 91 FL (ref 79–97)
MONOCYTES # BLD AUTO: 0.5 X10E3/UL (ref 0.1–0.9)
MONOCYTES NFR BLD AUTO: 10 %
NEUTROPHILS # BLD AUTO: 2.8 X10E3/UL (ref 1.4–7)
NEUTROPHILS NFR BLD AUTO: 52 %
PLATELET # BLD AUTO: 208 X10E3/UL (ref 150–450)
POTASSIUM SERPL-SCNC: 4.8 MMOL/L (ref 3.5–5.2)
PROT SERPL-MCNC: 8 G/DL (ref 6–8.5)
RBC # BLD AUTO: 4.75 X10E6/UL (ref 4.14–5.8)
SODIUM SERPL-SCNC: 140 MMOL/L (ref 134–144)
WBC # BLD AUTO: 5.2 X10E3/UL (ref 3.4–10.8)

## 2022-08-13 LAB
ALBUMIN SERPL-MCNC: 5.2 G/DL (ref 4.1–5.2)
ALP SERPL-CCNC: 80 IU/L (ref 44–121)
ALT SERPL-CCNC: 26 IU/L (ref 0–44)
AST SERPL-CCNC: 23 IU/L (ref 0–40)
BILIRUB DIRECT SERPL-MCNC: 0.18 MG/DL (ref 0–0.4)
BILIRUB SERPL-MCNC: 0.8 MG/DL (ref 0–1.2)

## 2022-11-13 RX ORDER — ESCITALOPRAM OXALATE 20 MG/1
20 TABLET ORAL DAILY
Qty: 90 TABLET | Refills: 0 | Status: SHIPPED | OUTPATIENT
Start: 2022-11-13 | End: 2023-02-11

## 2023-02-11 RX ORDER — ESCITALOPRAM OXALATE 20 MG/1
20 TABLET ORAL DAILY
Qty: 90 TABLET | Refills: 0 | Status: SHIPPED | OUTPATIENT
Start: 2023-02-11 | End: 2023-02-12 | Stop reason: SDUPTHER

## 2023-02-12 RX ORDER — ESCITALOPRAM OXALATE 20 MG/1
20 TABLET ORAL DAILY
Qty: 90 TABLET | Refills: 0 | Status: SHIPPED | OUTPATIENT
Start: 2023-02-12

## 2023-05-10 RX ORDER — ESCITALOPRAM OXALATE 20 MG/1
20 TABLET ORAL DAILY
Qty: 90 TABLET | Refills: 0 | OUTPATIENT
Start: 2023-05-10

## 2023-05-10 RX ORDER — ESCITALOPRAM OXALATE 10 MG/1
10 TABLET ORAL DAILY
Qty: 30 TABLET | Refills: 0 | Status: SHIPPED | OUTPATIENT
Start: 2023-05-10

## 2023-06-12 ENCOUNTER — PATIENT MESSAGE (OUTPATIENT)
Dept: FAMILY MEDICINE CLINIC | Facility: CLINIC | Age: 23
End: 2023-06-12
Payer: COMMERCIAL

## 2023-06-12 RX ORDER — ESCITALOPRAM OXALATE 10 MG/1
TABLET ORAL
Qty: 30 TABLET | Refills: 0 | Status: SHIPPED | OUTPATIENT
Start: 2023-06-12

## 2023-06-12 RX ORDER — ESCITALOPRAM OXALATE 10 MG/1
10 TABLET ORAL DAILY
Qty: 90 TABLET | Refills: 0 | Status: SHIPPED | OUTPATIENT
Start: 2023-06-12

## 2023-06-12 NOTE — TELEPHONE ENCOUNTER
Requested Prescriptions     Pending Prescriptions Disp Refills   • escitalopram (LEXAPRO) 10 MG tablet [Pharmacy Med Name: ESCITALOPRAM 10MG TABLETS] 30 tablet 0     Sig: TAKE 1 TABLET BY MOUTH EVERY DAY       LOV: 7/21/2023  NOV: Not scheduled

## 2023-06-12 NOTE — TELEPHONE ENCOUNTER
From: Artemio Muñoz  To: Lore Muñoz  Sent: 6/12/2023 7:38 AM EDT  Subject: Prescription     Good morning Lore, I am down to 4 doses on the 10mg escitalopram. Do we need to schedule a video meeting to go over this? I have been feeling fine with this dosage. My next question was if it was possible to get a higher quantity, like 90 as I am moving to [place], [place] July 13th so I don’t want to worry about finding a new primary care and having to get the prescription in a rush when I get out there. Thank you!

## 2023-09-11 RX ORDER — ESCITALOPRAM OXALATE 10 MG/1
10 TABLET ORAL DAILY
Qty: 30 TABLET | Refills: 0 | Status: SHIPPED | OUTPATIENT
Start: 2023-09-11

## 2024-11-13 ENCOUNTER — HOSPITAL ENCOUNTER (EMERGENCY)
Facility: HOSPITAL | Age: 24
Discharge: HOME | End: 2024-11-13
Attending: EMERGENCY MEDICINE | Admitting: EMERGENCY MEDICINE
Payer: COMMERCIAL

## 2024-11-13 VITALS
RESPIRATION RATE: 16 BRPM | OXYGEN SATURATION: 96 % | HEART RATE: 60 BPM | HEIGHT: 69 IN | DIASTOLIC BLOOD PRESSURE: 68 MMHG | TEMPERATURE: 98.2 F | WEIGHT: 128.97 LBS | BODY MASS INDEX: 19.1 KG/M2 | SYSTOLIC BLOOD PRESSURE: 121 MMHG

## 2024-11-13 DIAGNOSIS — Z77.29 CARBON MONOXIDE EXPOSURE: Primary | ICD-10-CM

## 2024-11-13 LAB
ANION GAP SERPL CALCULATED.3IONS-SCNC: 13 MMOL/L (ref 5–15)
BASOPHILS # BLD AUTO: 0.02 10*3/MM3 (ref 0–0.2)
BASOPHILS NFR BLD AUTO: 0.4 % (ref 0–1.5)
BUN SERPL-MCNC: 11 MG/DL (ref 6–20)
BUN/CREAT SERPL: 13.9 (ref 7–25)
CALCIUM SPEC-SCNC: 9.4 MG/DL (ref 8.6–10.5)
CHLORIDE SERPL-SCNC: 105 MMOL/L (ref 98–107)
CO2 SERPL-SCNC: 20 MMOL/L (ref 22–29)
COHGB MFR BLD: 2.9 % (ref 0–1.5)
CREAT SERPL-MCNC: 0.79 MG/DL (ref 0.76–1.27)
DEPRECATED RDW RBC AUTO: 40.9 FL (ref 37–54)
EGFRCR SERPLBLD CKD-EPI 2021: 126.4 ML/MIN/1.73
EOSINOPHIL # BLD AUTO: 0.09 10*3/MM3 (ref 0–0.4)
EOSINOPHIL NFR BLD AUTO: 1.8 % (ref 0.3–6.2)
ERYTHROCYTE [DISTWIDTH] IN BLOOD BY AUTOMATED COUNT: 12.6 % (ref 12.3–15.4)
GLUCOSE SERPL-MCNC: 92 MG/DL (ref 65–99)
HCT VFR BLD AUTO: 46 % (ref 37.5–51)
HGB BLD-MCNC: 15.8 G/DL (ref 13–17.7)
IMM GRANULOCYTES # BLD AUTO: 0.01 10*3/MM3 (ref 0–0.05)
IMM GRANULOCYTES NFR BLD AUTO: 0.2 % (ref 0–0.5)
LYMPHOCYTES # BLD AUTO: 1.59 10*3/MM3 (ref 0.7–3.1)
LYMPHOCYTES NFR BLD AUTO: 32.2 % (ref 19.6–45.3)
MCH RBC QN AUTO: 30.4 PG (ref 26.6–33)
MCHC RBC AUTO-ENTMCNC: 34.3 G/DL (ref 31.5–35.7)
MCV RBC AUTO: 88.5 FL (ref 79–97)
MONOCYTES # BLD AUTO: 0.57 10*3/MM3 (ref 0.1–0.9)
MONOCYTES NFR BLD AUTO: 11.5 % (ref 5–12)
NEUTROPHILS NFR BLD AUTO: 2.66 10*3/MM3 (ref 1.7–7)
NEUTROPHILS NFR BLD AUTO: 53.9 % (ref 42.7–76)
NRBC BLD AUTO-RTO: 0 /100 WBC (ref 0–0.2)
PLATELET # BLD AUTO: 189 10*3/MM3 (ref 140–450)
PMV BLD AUTO: 8.6 FL (ref 6–12)
POTASSIUM SERPL-SCNC: 3.5 MMOL/L (ref 3.5–5.2)
QT INTERVAL: 365 MS
QTC INTERVAL: 374 MS
RBC # BLD AUTO: 5.2 10*6/MM3 (ref 4.14–5.8)
SODIUM SERPL-SCNC: 138 MMOL/L (ref 136–145)
WBC NRBC COR # BLD AUTO: 4.94 10*3/MM3 (ref 3.4–10.8)

## 2024-11-13 PROCEDURE — 99283 EMERGENCY DEPT VISIT LOW MDM: CPT

## 2024-11-13 PROCEDURE — 82375 ASSAY CARBOXYHB QUANT: CPT | Performed by: PHYSICIAN ASSISTANT

## 2024-11-13 PROCEDURE — 93010 ELECTROCARDIOGRAM REPORT: CPT | Performed by: INTERNAL MEDICINE

## 2024-11-13 PROCEDURE — 80048 BASIC METABOLIC PNL TOTAL CA: CPT | Performed by: PHYSICIAN ASSISTANT

## 2024-11-13 PROCEDURE — 93005 ELECTROCARDIOGRAM TRACING: CPT | Performed by: PHYSICIAN ASSISTANT

## 2024-11-13 PROCEDURE — 36415 COLL VENOUS BLD VENIPUNCTURE: CPT

## 2024-11-13 PROCEDURE — 85025 COMPLETE CBC W/AUTO DIFF WBC: CPT | Performed by: PHYSICIAN ASSISTANT

## 2024-11-13 RX ORDER — SODIUM CHLORIDE 0.9 % (FLUSH) 0.9 %
10 SYRINGE (ML) INJECTION AS NEEDED
Status: DISCONTINUED | OUTPATIENT
Start: 2024-11-13 | End: 2024-11-13 | Stop reason: HOSPADM

## 2024-11-13 NOTE — ED PROVIDER NOTES
SHARED VISIT: This visit was performed by BOTH a physician and an APC. The substantive portion of the medical decision making was performed by this attesting physician who made or approved the management plan and takes responsibility for patient management. All studies in the APC note (if performed) were independently interpreted by me.      The AIYANA and I have discussed this patient's history, physical exam, and treatment plan.  I have reviewed the documentation and personally had a face to face interaction with the patient. I affirm the documentation and agree with the treatment and plan.  The attached note describes my personal findings.       I provided a substantive portion of the care of the patient.  I personally performed the physical exam in its entirety, and below are my findings.        History  24-year-old male without significant past medical history presents with lightheadedness.  He feels like he might have a carbon monoxide leak in his car.  He states his symptoms are worse when he turns on his heater.  Denies chest pain or trouble breathing.    Physical Exam  Vital Signs reviewed  GENERAL: Alert well-appearing male no obvious distress.  Triage vitals reviewed notable for blood pressure 125/81.  Pulse, temperature and O2 sats normal  HENT: nares patent  EYES: no scleral icterus  CV: regular rhythm, regular rate-no murmur  RESPIRATORY: normal effort, clear to auscultation bilaterally-O2 sats upper 90s room air  ABDOMEN: soft  MUSCULOSKELETAL: no deformity  NEURO: Strength sensation and coordination are grossly intact.  Speech and mentation are unremarkable  SKIN: warm, dry      Assessment and Data Review    ED Course as of 11/13/24 1551   Wed Nov 13, 2024   0907 EKG independently interpreted by me    Time 9:02 AM  Sinus 63  Normal P waves and SD intervals  QRS-normal axis, normal QRS  ST, T waves-nonspecific changes  Not significant change when compared to 2022 [DB]      ED Course User Index  [DB]  Stevo Murguia MD       I did discuss treatment and evaluation of this patient with CARTER Puente.    I did independently interpret and evaluate ED testing which is notable for the following:    EKG unremarkable as interpreted above  ED labs independently interpreted by me show no obvious infection, anemia or significant hepatic or renal failure.  No significant electrolyte disturbances.    Patient with carbon oxide level of 2.9 which is not consistent with significant carbon oxide exposure at this time.  Would recommend that patient get his car tested for carbon monoxide exposure and possibly put car monoxide alarm within his car.     Stevo Murguia MD  11/13/24 5744       Stevo Murguia MD  11/13/24 5597

## 2024-11-13 NOTE — ED PROVIDER NOTES
EMERGENCY DEPARTMENT ENCOUNTER      Room Number:  02/02  PCP: Lore Muñoz PA-C  Independent Historians: Patient  Patient Care Team:  Lore Muñoz PA-C as PCP - General (Family Medicine)       HPI:  Chief Complaint: Lightheaded    A complete HPI/ROS/PMH/PSH/SH/FH are unobtainable due to: None    Chronic or social conditions impacting patient care (Social Determinants of Health): None      Context: Artemio Muñoz is a 24 y.o. male who presents to the ED c/o acute lightheadedness.  The patient reports that he turned the heat on in his car for the first time this year and started noticing a funny smell.  He drove for several minutes and upon exiting the vehicle was feeling somewhat lightheaded and mildly disoriented.  He presents now with concern for CO exposure.  States that he has not used the heat in his car for a very long time.  Denies palpitations, nausea, vomiting.  His symptoms have resolved somewhat since that time but he continues with some mild lightheadedness.      Upon review of prior external notes (non-ED) -and- Review of prior external test results outside of this encounter it appears the patient was evaluated in the office with Family Medicine for generalized anxiety on 7/21/2022.  The patient had a normal CBC on 7/21/2022 and a normal sed rate on 2/14/2022.      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 No pertinent surgical history.      FAMILY HISTORY  Family History   Problem Relation Age of Onset    Hyperlipidemia Mother     Hypertension Mother     Anxiety disorder Sister          SOCIAL HISTORY  Social History     Socioeconomic History    Marital status: Significant Other   Tobacco  Use    Smoking status: Never    Smokeless tobacco: Never   Vaping Use    Vaping status: Never Used   Substance and Sexual Activity    Alcohol use: Yes     Alcohol/week: 1.0 standard drink of alcohol     Types: 1 Cans of beer per week    Drug use: Never    Sexual activity: Defer         ALLERGIES  Amoxicillin-pot clavulanate, Amoxicillin, and Penicillins      REVIEW OF SYSTEMS  Included in HPI  All systems reviewed and negative except for those discussed in HPI.      PHYSICAL EXAM    I have reviewed the triage vital signs and nursing notes.    ED Triage Vitals   Temp Heart Rate Resp BP SpO2   11/13/24 0822 11/13/24 0822 11/13/24 0822 11/13/24 0828 11/13/24 0822   98.2 °F (36.8 °C) 101 16 145/89 97 %      Temp src Heart Rate Source Patient Position BP Location FiO2 (%)   -- -- -- -- --              Physical Exam  Constitutional:       General: He is not in acute distress.     Appearance: He is well-developed. He is not ill-appearing or toxic-appearing.   HENT:      Head: Normocephalic and atraumatic.   Eyes:      General: No scleral icterus.     Conjunctiva/sclera: Conjunctivae normal.   Neck:      Trachea: No tracheal deviation.   Cardiovascular:      Rate and Rhythm: Normal rate and regular rhythm.   Pulmonary:      Effort: Pulmonary effort is normal.      Breath sounds: Normal breath sounds.   Abdominal:      Palpations: Abdomen is soft.      Tenderness: There is no abdominal tenderness. There is no guarding.   Musculoskeletal:         General: No deformity.      Cervical back: Normal range of motion.   Lymphadenopathy:      Cervical: No cervical adenopathy.   Skin:     General: Skin is warm and dry.   Neurological:      Mental Status: He is alert and oriented to person, place, and time.      Sensory: Sensation is intact.      Motor: Motor function is intact.   Psychiatric:         Behavior: Behavior normal.         Vital signs and nursing notes reviewed.      PPE: I wore a surgical mask throughout my encounters  with the pt. I performed hand hygiene on entry into the pt room and upon exit.     LAB RESULTS  Recent Results (from the past 24 hours)   Basic Metabolic Panel    Collection Time: 11/13/24  8:47 AM    Specimen: Blood   Result Value Ref Range    Glucose 92 65 - 99 mg/dL    BUN 11 6 - 20 mg/dL    Creatinine 0.79 0.76 - 1.27 mg/dL    Sodium 138 136 - 145 mmol/L    Potassium 3.5 3.5 - 5.2 mmol/L    Chloride 105 98 - 107 mmol/L    CO2 20.0 (L) 22.0 - 29.0 mmol/L    Calcium 9.4 8.6 - 10.5 mg/dL    BUN/Creatinine Ratio 13.9 7.0 - 25.0    Anion Gap 13.0 5.0 - 15.0 mmol/L    eGFR 126.4 >60.0 mL/min/1.73   CBC Auto Differential    Collection Time: 11/13/24  8:47 AM    Specimen: Blood   Result Value Ref Range    WBC 4.94 3.40 - 10.80 10*3/mm3    RBC 5.20 4.14 - 5.80 10*6/mm3    Hemoglobin 15.8 13.0 - 17.7 g/dL    Hematocrit 46.0 37.5 - 51.0 %    MCV 88.5 79.0 - 97.0 fL    MCH 30.4 26.6 - 33.0 pg    MCHC 34.3 31.5 - 35.7 g/dL    RDW 12.6 12.3 - 15.4 %    RDW-SD 40.9 37.0 - 54.0 fl    MPV 8.6 6.0 - 12.0 fL    Platelets 189 140 - 450 10*3/mm3    Neutrophil % 53.9 42.7 - 76.0 %    Lymphocyte % 32.2 19.6 - 45.3 %    Monocyte % 11.5 5.0 - 12.0 %    Eosinophil % 1.8 0.3 - 6.2 %    Basophil % 0.4 0.0 - 1.5 %    Immature Grans % 0.2 0.0 - 0.5 %    Neutrophils, Absolute 2.66 1.70 - 7.00 10*3/mm3    Lymphocytes, Absolute 1.59 0.70 - 3.10 10*3/mm3    Monocytes, Absolute 0.57 0.10 - 0.90 10*3/mm3    Eosinophils, Absolute 0.09 0.00 - 0.40 10*3/mm3    Basophils, Absolute 0.02 0.00 - 0.20 10*3/mm3    Immature Grans, Absolute 0.01 0.00 - 0.05 10*3/mm3    nRBC 0.0 0.0 - 0.2 /100 WBC   ECG 12 Lead Other; Near syncope    Collection Time: 11/13/24  9:02 AM   Result Value Ref Range    QT Interval 365 ms    QTC Interval 374 ms   Carboxyhemoglobin    Collection Time: 11/13/24  9:50 AM    Specimen: Blood   Result Value Ref Range    Carboxyhemoglobin 2.9 (H) 0 - 1.5 %         RADIOLOGY  No Radiology Exams Resulted Within Past 24 Hours      MEDICATIONS  GIVEN IN ER  Medications   sodium chloride 0.9 % flush 10 mL (has no administration in time range)         ORDERS PLACED DURING THIS VISIT:  Orders Placed This Encounter   Procedures    Carboxyhemoglobin    Basic Metabolic Panel    CBC Auto Differential    ECG 12 Lead Other; Near syncope    Insert Peripheral IV    CBC & Differential         OUTPATIENT MEDICATION MANAGEMENT:  Current Facility-Administered Medications Ordered in Epic   Medication Dose Route Frequency Provider Last Rate Last Admin    sodium chloride 0.9 % flush 10 mL  10 mL Intravenous PRN Stevo Puente PA         Current Outpatient Medications Ordered in Epic   Medication Sig Dispense Refill    escitalopram (LEXAPRO) 10 MG tablet TAKE 1 TABLET BY MOUTH EVERY DAY 30 tablet 0    escitalopram (LEXAPRO) 10 MG tablet TAKE 1 TABLET BY MOUTH DAILY 30 tablet 0    LORazepam (Ativan) 1 MG tablet 1/2-1 PO Q 8 hours PRN panic attacks 20 tablet 0         PROGRESS, DATA ANALYSIS, CONSULTS, AND MEDICAL DECISION MAKING  All labs have been independently interpreted by me.  All radiology studies have been reviewed by me. All EKG's have been independently viewed and interpreted by me.  Discussion below represents my analysis of pertinent findings related to patient's condition, differential diagnosis, treatment plan and final disposition.    Patient presentation and evaluation most consistent with very mild carbon oxide exposure.  His carboxyhemoglobin level is reassuring and his symptoms have resolved throughout ED course.  Appropriate for outpatient management.  He has been encouraged to service his vehicle appropriately and to not drive the vehicle until he has it evaluated.  Patient understands and agrees.  Close return precautions given.    DIFFERENTIAL DIAGNOSIS INCLUDE BUT NOT LIMITED TO:     Anxiety, carbon monoxide exposure, hyperventilation    Clinical Scores: N/A      ED Course as of 11/13/24 1116   Wed Nov 13, 2024   0907 EKG independently interpreted by  me    Time 9:02 AM  Sinus 63  Normal P waves and OK intervals  QRS-normal axis, normal QRS  ST, T waves-nonspecific changes  Not significant change when compared to 2022 [DB]      ED Course User Index  [DB] Stevo Murguia MD         1117 I rechecked the patient.  I discussed the patient's labs, diagnosis, and plan for discharge.  A repeat exam reveals no new worrisome changes from my initial exam findings.  The patient understands that the fact that they are being discharged does not denote that nothing is abnormal, it indicates that no clinical emergency is present and that they must follow-up as directed in order to properly maintain their health.  Follow-up instructions (specifically listed below) and return to ER precautions were given at this time.  I specifically instructed the patient to follow-up with their PCP.  The patient understands and agrees with the plan, and is ready for discharge.  All questions answered.         AS OF 11:16 EST VITALS:    BP - 121/68  HR - 60  TEMP - 98.2 °F (36.8 °C)  O2 SATS - 96%    COMPLEXITY OF CARE  Admission was considered but after careful review of the patient's presentation, physical examination, diagnostic results, and response to treatment the patient may be safely discharged with outpatient follow-up.      DIAGNOSIS  Final diagnoses:   Carbon monoxide exposure         DISPOSITION  ED Disposition       ED Disposition   Discharge    Condition   Stable    Comment   --                Please note that portions of this document were completed with a voice recognition program.    Note Disclaimer: At [facility], we believe that sharing information builds trust and better relationships. You are receiving this note because you recently visited [facility]. It is possible you will see health information before a provider has talked with you about it. This kind of information can be easy to misunderstand. To help you fully understand what it means for your health, we urge  you to discuss this note with your provider.         Stevo Puente PA  11/13/24 1119

## 2024-11-13 NOTE — ED NOTES
"Patient to ER via car from home for \"I think my car has a carbon dioxide leak\" state he was in his car for aprox 25 minutes and when he got out he had a head dizziness and felt light headed   "

## 2024-11-14 ENCOUNTER — TELEPHONE (OUTPATIENT)
Dept: FAMILY MEDICINE CLINIC | Facility: CLINIC | Age: 24
End: 2024-11-14
Payer: COMMERCIAL

## 2024-11-14 NOTE — TELEPHONE ENCOUNTER
Called patient and scheduled Hospital f/up appt for him  
PATIENT SENT IN A REQUEST FOR A HOSPITAL FOLLOW UP FOR Lingering headache after carbon monoxide exposure.    THE SOONEST AVAILABILITY IS 12/31/24  
96

## 2025-01-02 ENCOUNTER — OFFICE VISIT (OUTPATIENT)
Dept: FAMILY MEDICINE CLINIC | Facility: CLINIC | Age: 25
End: 2025-01-02
Payer: COMMERCIAL

## 2025-01-02 VITALS
WEIGHT: 156 LBS | DIASTOLIC BLOOD PRESSURE: 68 MMHG | TEMPERATURE: 98.1 F | SYSTOLIC BLOOD PRESSURE: 116 MMHG | RESPIRATION RATE: 16 BRPM | HEIGHT: 69 IN | OXYGEN SATURATION: 97 % | HEART RATE: 72 BPM | BODY MASS INDEX: 23.11 KG/M2

## 2025-01-02 DIAGNOSIS — J06.9 ACUTE URI: Primary | ICD-10-CM

## 2025-01-02 PROCEDURE — 99213 OFFICE O/P EST LOW 20 MIN: CPT | Performed by: PHYSICIAN ASSISTANT

## 2025-01-02 RX ORDER — METHYLPREDNISOLONE 4 MG/1
TABLET ORAL
Qty: 21 TABLET | Refills: 0 | Status: SHIPPED | OUTPATIENT
Start: 2025-01-02

## 2025-01-02 RX ORDER — AZITHROMYCIN 250 MG/1
TABLET, FILM COATED ORAL
Qty: 6 TABLET | Refills: 1 | Status: SHIPPED | OUTPATIENT
Start: 2025-01-02
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
## 2025-03-31 DIAGNOSIS — J30.9 CHRONIC ALLERGIC RHINITIS: Primary | ICD-10-CM

## 2025-03-31 DIAGNOSIS — Z00.00 LABORATORY EXAMINATION ORDERED AS PART OF A ROUTINE GENERAL MEDICAL EXAMINATION: ICD-10-CM

## 2025-03-31 DIAGNOSIS — E55.9 VITAMIN D DEFICIENCY: ICD-10-CM

## 2025-04-02 DIAGNOSIS — Z11.3 SCREEN FOR STD (SEXUALLY TRANSMITTED DISEASE): Primary | ICD-10-CM

## 2025-04-24 LAB
25(OH)D3+25(OH)D2 SERPL-MCNC: 27.7 NG/ML (ref 30–100)
ALBUMIN SERPL-MCNC: 5.1 G/DL (ref 4.3–5.2)
ALP SERPL-CCNC: 83 IU/L (ref 44–121)
ALT SERPL-CCNC: 9 IU/L (ref 0–44)
APPEARANCE UR: CLEAR
AST SERPL-CCNC: 13 IU/L (ref 0–40)
BACTERIA #/AREA URNS HPF: NORMAL /[HPF]
BASOPHILS # BLD AUTO: 0 X10E3/UL (ref 0–0.2)
BASOPHILS NFR BLD AUTO: 0 %
BILIRUB SERPL-MCNC: 0.5 MG/DL (ref 0–1.2)
BILIRUB UR QL STRIP: NEGATIVE
BUN SERPL-MCNC: 12 MG/DL (ref 6–20)
BUN/CREAT SERPL: 13 (ref 9–20)
CALCIUM SERPL-MCNC: 9.8 MG/DL (ref 8.7–10.2)
CASTS URNS QL MICRO: NORMAL /LPF
CHLORIDE SERPL-SCNC: 100 MMOL/L (ref 96–106)
CHOLEST SERPL-MCNC: 198 MG/DL (ref 100–199)
CO2 SERPL-SCNC: 25 MMOL/L (ref 20–29)
COLOR UR: YELLOW
CREAT SERPL-MCNC: 0.89 MG/DL (ref 0.76–1.27)
EGFRCR SERPLBLD CKD-EPI 2021: 122 ML/MIN/1.73
EOSINOPHIL # BLD AUTO: 0 X10E3/UL (ref 0–0.4)
EOSINOPHIL NFR BLD AUTO: 1 %
EPI CELLS #/AREA URNS HPF: NORMAL /HPF (ref 0–10)
ERYTHROCYTE [DISTWIDTH] IN BLOOD BY AUTOMATED COUNT: 12.5 % (ref 11.6–15.4)
FOLATE SERPL-MCNC: 7.3 NG/ML
GLOBULIN SER CALC-MCNC: 2.6 G/DL (ref 1.5–4.5)
GLUCOSE SERPL-MCNC: 79 MG/DL (ref 70–99)
GLUCOSE UR QL STRIP: NEGATIVE
HCT VFR BLD AUTO: 46.7 % (ref 37.5–51)
HDLC SERPL-MCNC: 61 MG/DL
HGB BLD-MCNC: 15.6 G/DL (ref 13–17.7)
HGB UR QL STRIP: NEGATIVE
IMM GRANULOCYTES # BLD AUTO: 0 X10E3/UL (ref 0–0.1)
IMM GRANULOCYTES NFR BLD AUTO: 0 %
KETONES UR QL STRIP: NEGATIVE
LDLC SERPL CALC-MCNC: 124 MG/DL (ref 0–99)
LEUKOCYTE ESTERASE UR QL STRIP: NEGATIVE
LYMPHOCYTES # BLD AUTO: 1.9 X10E3/UL (ref 0.7–3.1)
LYMPHOCYTES NFR BLD AUTO: 36 %
MCH RBC QN AUTO: 29.6 PG (ref 26.6–33)
MCHC RBC AUTO-ENTMCNC: 33.4 G/DL (ref 31.5–35.7)
MCV RBC AUTO: 89 FL (ref 79–97)
MICRO URNS: NORMAL
MICRO URNS: NORMAL
MONOCYTES # BLD AUTO: 0.4 X10E3/UL (ref 0.1–0.9)
MONOCYTES NFR BLD AUTO: 8 %
NEUTROPHILS # BLD AUTO: 3 X10E3/UL (ref 1.4–7)
NEUTROPHILS NFR BLD AUTO: 55 %
NITRITE UR QL STRIP: NEGATIVE
PH UR STRIP: 7 [PH] (ref 5–7.5)
PLATELET # BLD AUTO: 252 X10E3/UL (ref 150–450)
POTASSIUM SERPL-SCNC: 4.5 MMOL/L (ref 3.5–5.2)
PROT SERPL-MCNC: 7.7 G/DL (ref 6–8.5)
PROT UR QL STRIP: NEGATIVE
RBC # BLD AUTO: 5.27 X10E6/UL (ref 4.14–5.8)
RBC #/AREA URNS HPF: NORMAL /HPF (ref 0–2)
SODIUM SERPL-SCNC: 139 MMOL/L (ref 134–144)
SP GR UR STRIP: 1.01 (ref 1–1.03)
T4 FREE SERPL-MCNC: 1.29 NG/DL (ref 0.82–1.77)
TRIGL SERPL-MCNC: 74 MG/DL (ref 0–149)
TSH SERPL DL<=0.005 MIU/L-ACNC: 1.18 UIU/ML (ref 0.45–4.5)
UROBILINOGEN UR STRIP-MCNC: 0.2 MG/DL (ref 0.2–1)
VIT B12 SERPL-MCNC: 668 PG/ML (ref 232–1245)
VLDLC SERPL CALC-MCNC: 13 MG/DL (ref 5–40)
WBC # BLD AUTO: 5.4 X10E3/UL (ref 3.4–10.8)
WBC #/AREA URNS HPF: NORMAL /HPF (ref 0–5)

## 2025-04-25 ENCOUNTER — RESULTS FOLLOW-UP (OUTPATIENT)
Dept: FAMILY MEDICINE CLINIC | Facility: CLINIC | Age: 25
End: 2025-04-25
Payer: COMMERCIAL

## 2025-04-25 LAB
C TRACH RRNA SPEC QL NAA+PROBE: NEGATIVE
HAV IGM SERPL QL IA: NEGATIVE
HBV CORE IGM SERPL QL IA: NEGATIVE
HBV SURFACE AG SERPL QL IA: NEGATIVE
HCV AB SERPL QL IA: NON REACTIVE
HCV AB SERPL QL IA: NORMAL
HIV 1+2 AB+HIV1 P24 AG SERPL QL IA: NON REACTIVE
N GONORRHOEA RRNA SPEC QL NAA+PROBE: NEGATIVE
RPR SER QL: NON REACTIVE
T VAGINALIS RRNA SPEC QL NAA+PROBE: NEGATIVE